# Patient Record
Sex: FEMALE | Race: WHITE | NOT HISPANIC OR LATINO | Employment: UNEMPLOYED | URBAN - METROPOLITAN AREA
[De-identification: names, ages, dates, MRNs, and addresses within clinical notes are randomized per-mention and may not be internally consistent; named-entity substitution may affect disease eponyms.]

---

## 2017-01-27 ENCOUNTER — OFFICE VISIT (OUTPATIENT)
Dept: NEUROLOGY | Facility: CLINIC | Age: 47
End: 2017-01-27

## 2017-01-27 VITALS
HEART RATE: 92 BPM | DIASTOLIC BLOOD PRESSURE: 70 MMHG | SYSTOLIC BLOOD PRESSURE: 112 MMHG | OXYGEN SATURATION: 96 % | HEIGHT: 63 IN

## 2017-01-27 DIAGNOSIS — G62.9 POLYNEUROPATHY: Primary | ICD-10-CM

## 2017-01-27 DIAGNOSIS — R55 SYNCOPE AND COLLAPSE: ICD-10-CM

## 2017-01-27 PROCEDURE — 99205 OFFICE O/P NEW HI 60 MIN: CPT | Performed by: PSYCHIATRY & NEUROLOGY

## 2017-01-27 RX ORDER — CARVEDILOL 6.25 MG/1
6.25 TABLET ORAL 2 TIMES DAILY WITH MEALS
COMMUNITY
End: 2017-08-25

## 2017-01-27 RX ORDER — HYDROCODONE BITARTRATE AND ACETAMINOPHEN 10; 325 MG/1; MG/1
1 TABLET ORAL 3 TIMES DAILY
COMMUNITY

## 2017-01-27 RX ORDER — CITALOPRAM 20 MG/1
20 TABLET ORAL DAILY
COMMUNITY
End: 2017-08-25

## 2017-01-27 RX ORDER — PROMETHAZINE HYDROCHLORIDE 25 MG/1
25 TABLET ORAL EVERY 8 HOURS PRN
COMMUNITY
End: 2017-08-25

## 2017-01-27 RX ORDER — GLYBURIDE-METFORMIN HYDROCHLORIDE 5; 500 MG/1; MG/1
1 TABLET ORAL 3 TIMES DAILY
COMMUNITY
End: 2019-03-19 | Stop reason: HOSPADM

## 2017-01-27 RX ORDER — NAPROXEN 500 MG/1
500 TABLET ORAL 2 TIMES DAILY PRN
COMMUNITY
End: 2019-03-19 | Stop reason: HOSPADM

## 2017-01-27 RX ORDER — GABAPENTIN 600 MG/1
1200 TABLET ORAL 3 TIMES DAILY
COMMUNITY

## 2017-01-27 RX ORDER — LEVETIRACETAM 750 MG/1
TABLET ORAL
COMMUNITY
End: 2017-01-27

## 2017-01-27 RX ORDER — LISINOPRIL 20 MG/1
20 TABLET ORAL DAILY
Qty: 30 TABLET | Refills: 11 | Status: SHIPPED | OUTPATIENT
Start: 2017-01-27 | End: 2017-08-25

## 2017-01-27 RX ORDER — LISINOPRIL 40 MG/1
40 TABLET ORAL DAILY
COMMUNITY
End: 2017-01-27 | Stop reason: SDUPTHER

## 2017-01-27 RX ORDER — TIZANIDINE 4 MG/1
8 TABLET ORAL 3 TIMES DAILY PRN
COMMUNITY

## 2017-01-27 NOTE — LETTER
January 27, 2017     Gardenia Weiss, DREAD  [address]  [address]  [facility] [address]    Patient: Yissel Lopez   YOB: 1970   Date of Visit: 1/27/2017       Dear Dr. Weiss, DREAD:    Thank you for referring Yissel Lopez to me for evaluation. Below are the relevant portions of my assessment and plan of care.         Problems Addressed this Visit        Cardiovascular and Mediastinum    Syncope and collapse     Secondary to autonomic neuropathy from DM.  Decrease Lisinopril 20 mg qday              Nervous and Auditory    Polyneuropathy - Primary     Severe TIIDM neuropathy    D/C Keppra  MRI changes secondary to DM                      If you have questions, please do not hesitate to call me. I look forward to following Yissel along with you.         Sincerely,        Preston Johnston MD        CC: No Recipients

## 2017-01-27 NOTE — LETTER
January 27, 2017     Gabriel Vidales MD  [address] [place] [address]    Patient: Yissel Lopez   YOB: 1970   Date of Visit: 1/27/2017       Dear Dr. Sobeida MD:    Yissel Lopez was in my office today. Below are the relevant portions of my assessment and plan of care.           If you have questions, please do not hesitate to call me. I look forward to following Yissel along with you.         Sincerely,        Preston Johnston MD        CC: No Recipients

## 2017-01-27 NOTE — MR AVS SNAPSHOT
Yissel Lopez   1/27/2017 1:30 PM   Office Visit    Dept Phone:  [phone]   Encounter #:  [id number]    Provider:  Preston Johnston MD   Department:  [facility] NEUROLOGY                Your Full Care Plan              Today's Medication Changes          These changes are accurate as of: 1/27/17  2:26 PM.  If you have any questions, ask your nurse or doctor.               Medication(s)that have changed:     lisinopril 20 MG tablet   Commonly known as:  PRINIVIL,ZESTRIL   Take 1 tablet by mouth Daily.   What changed:    - medication strength  - how much to take   Changed by:  Preston Johnston MD         Stop taking medication(s)listed here:     cephalexin 500 MG capsule   Commonly known as:  KEFLEX   Stopped by:  Preston Johnston MD           levETIRAcetam 750 MG tablet   Commonly known as:  KEPPRA   Stopped by:  Preston Johnston MD                Where to Get Your Medications      These medications were sent to [address] [phone] [place] [id number] [address] [address]     Phone:  [phone]     lisinopril 20 MG tablet                  Your Updated Medication List          This list is accurate as of: 1/27/17  2:26 PM.  Always use your most recent med list.                carvedilol 6.25 MG tablet   Commonly known as:  COREG       citalopram 20 MG tablet   Commonly known as:  CeleXA       gabapentin 600 MG tablet   Commonly known as:  NEURONTIN       glyBURIDE-metFORMIN 5-500 MG per tablet   Commonly known as:  GLUCOVANCE       HYDROcodone-acetaminophen  MG per tablet   Commonly known as:  NORCO       lisinopril 20 MG tablet   Commonly known as:  PRINIVIL,ZESTRIL   Take 1 tablet by mouth Daily.       naproxen 500 MG tablet   Commonly known as:  NAPROSYN       promethazine 25 MG tablet   Commonly known as:  PHENERGAN       tiZANidine 4 MG tablet   Commonly known as:  ZANAFLEX             "     You Were Diagnosed With        Codes Comments    Polyneuropathy    -  Primary ICD-10-CM: G62.9  ICD-9-CM: 356.9     Syncope and collapse     ICD-10-CM: R55  ICD-9-CM: 780.2       Instructions     None    Patient Instructions History      Upcoming Appointments     Visit Type Date Time Department    NEW PATIENT 1/27/2017  1:30 PM [facility]      [employer] Signup     Our records indicate that your [employer] account has been deactivated. If you would like to reactivate your account, please email [email or web address] or call [phone] to talk to our "[email or web address] staff.             Other Info from Your Visit           Allergies     Erythromycin      Tramadol        Reason for Visit     Multiple Sclerosis           Vital Signs     Blood Pressure Pulse Height Oxygen Saturation Smoking Status       112/70 92 63\" (160 cm) 96% Never Smoker       Problems and Diagnoses Noted     Polyneuropathy    Fainting        "

## 2017-01-27 NOTE — PROGRESS NOTES
Subjective:     Patient ID: Yissel Lopez is a 46 y.o. female.    History of Present Illness     45 yo woman referred by Dr Vidales for dizzy spells/syncope and Headaches. Episodes of syncope when going from sitting to standing.  Three episodes of LOC.   HA are located in occiput are daily of mild intensity.  Dull aching pain.   Dizzy spells with change in position.     MRI Brain, my review of films, non specific T2 signal changes    Reviewed Dr Vidales's records:  ALAYNA -normal   SSEP with delayed conduction from c-spine to cortex  Pt reported dizziness, weakness and difficulty walking.   Gluc 458, Na 132,   CBC - microcytic anemia    The following portions of the patient's history were reviewed and updated as appropriate: allergies, current medications, past family history, past medical history, past social history, past surgical history and problem list.    Review of Systems   Constitutional: Positive for fatigue. Negative for activity change and unexpected weight change.   HENT: Negative for tinnitus and trouble swallowing.    Eyes: Negative for photophobia and visual disturbance.   Respiratory: Negative for apnea, cough and choking.    Cardiovascular: Negative for leg swelling.   Gastrointestinal: Negative for nausea and vomiting.   Endocrine: Negative for cold intolerance and heat intolerance.   Genitourinary: Negative for difficulty urinating, frequency, menstrual problem and urgency.   Musculoskeletal: Positive for gait problem. Negative for back pain, myalgias and neck pain.   Skin: Negative for color change and rash.   Allergic/Immunologic: Negative for immunocompromised state.   Neurological: Positive for dizziness, tremors, syncope, weakness and headaches. Negative for seizures, facial asymmetry, speech difficulty, light-headedness and numbness.   Hematological: Negative for adenopathy. Does not bruise/bleed easily.   Psychiatric/Behavioral: Positive for decreased concentration. Negative for behavioral  "problems, confusion, hallucinations and sleep disturbance.        Objective:  Vitals:    01/27/17 1353   BP: 112/70   Pulse: 92   SpO2: 96%   Height: 63\" (160 cm)         Neurologic Exam     Mental Status   Oriented to person, place, and time.   Registration: recalls 3 of 3 objects. Recall at 5 minutes: recalls 3 of 3 objects. Follows 3 step commands.   Attention: decreased. Concentration: decreased.   Speech: slurred   Level of consciousness: alert  Knowledge: good and consistent with education.   Able to name object. Able to read. Able to repeat. Able to write. Normal comprehension.     Cranial Nerves     CN II   Visual fields full to confrontation.   Visual acuity: normal  Right visual field deficit: none  Left visual field deficit: none     CN III, IV, VI   Pupils are equal, round, and reactive to light.  Extraocular motions are normal.   Right pupil: Shape: regular. Reactivity: brisk. Consensual response: intact.   Left pupil: Shape: regular. Reactivity: brisk. Consensual response: intact.   Nystagmus: none   Diplopia: none  Ophthalmoparesis: none  Upgaze: normal  Downgaze: normal  Conjugate gaze: present  Vestibulo-ocular reflex: present    CN V   Facial sensation intact.   Right corneal reflex: normal  Left corneal reflex: normal    CN VII   Right facial weakness: none  Left facial weakness: none    CN VIII   Hearing: intact    CN IX, X   Palate: symmetric  Right gag reflex: normal  Left gag reflex: normal    CN XI   Right sternocleidomastoid strength: normal  Left sternocleidomastoid strength: normal    CN XII   Tongue: not atrophic  Fasciculations: absent  Tongue deviation: none    Motor Exam   Muscle bulk: normal  Overall muscle tone: normal  Right arm tone: normal  Left arm tone: normal  Right leg tone: normal  Left leg tone: normal    Strength   Strength 5/5 throughout.     Sensory Exam   Right arm light touch: decreased from elbow  Left arm light touch: decreased from elbow  Right leg light touch: " decreased from knee  Left leg light touch: decreased from knee  Right arm vibration: decreased from elbow  Left arm vibration: decreased from elbow  Right leg vibration: decreased from knee  Left leg vibration: decreased from knee  Right arm proprioception: decreased from elbow  Left arm proprioception: decreased from elbow  Right leg proprioception: decreased from knee  Left leg proprioception: decreased from knee  Right arm pinprick: decreased from elbow  Left arm pinprick: decreased from elbow  Right leg pinprick: decreased from knee  Left leg pinprick: decreased from knee    Gait, Coordination, and Reflexes     Gait  Gait: shuffling and wide-based    Coordination   Romberg: positive  Finger to nose coordination: abnormal  Heel to shin coordination: abnormal  Tandem walking coordination: abnormal    Tremor   Resting tremor: absent  Intention tremor: present  Action tremor: absent    Reflexes   Reflexes 2+ except as noted.   Right brachioradialis: 0  Left brachioradialis: 0  Right biceps: 0  Left biceps: 0  Right triceps: 0  Left triceps: 0  Right patellar: 0  Left patellar: 0  Right achilles: 0  Left achilles: 0  Right ankle clonus: absent  Left ankle clonus: absent      Physical Exam   Constitutional: She is oriented to person, place, and time.   Eyes: EOM are normal. Pupils are equal, round, and reactive to light.   Neurological: She is oriented to person, place, and time. She has normal strength. She has an abnormal Finger-Nose-Finger Test, an abnormal Heel to More Test, an abnormal Romberg Test and an abnormal Tandem Gait Test.   Reflex Scores:       Tricep reflexes are 0 on the right side and 0 on the left side.       Bicep reflexes are 0 on the right side and 0 on the left side.       Brachioradialis reflexes are 0 on the right side and 0 on the left side.       Patellar reflexes are 0 on the right side and 0 on the left side.       Achilles reflexes are 0 on the right side and 0 on the left  side.  Psychiatric: Her speech is slurred.       Assessment/Plan:       Problems Addressed this Visit        Cardiovascular and Mediastinum    Syncope and collapse     Secondary to autonomic neuropathy from DM.  Decrease Lisinopril 20 mg qday              Nervous and Auditory    Polyneuropathy - Primary     Severe TIIDM neuropathy    D/C Keppra  MRI changes secondary to DM

## 2017-02-27 ENCOUNTER — HOSPITAL ENCOUNTER (EMERGENCY)
Facility: HOSPITAL | Age: 47
Discharge: HOME | End: 2017-02-27
Attending: EMERGENCY MEDICINE | Admitting: EMERGENCY MEDICINE

## 2017-02-27 VITALS
HEIGHT: 63 IN | SYSTOLIC BLOOD PRESSURE: 182 MMHG | DIASTOLIC BLOOD PRESSURE: 89 MMHG | TEMPERATURE: 98.6 F | WEIGHT: 230 LBS | HEART RATE: 90 BPM | OXYGEN SATURATION: 96 % | RESPIRATION RATE: 20 BRPM | BODY MASS INDEX: 40.75 KG/M2

## 2017-02-27 DIAGNOSIS — I10 ESSENTIAL HYPERTENSION: Primary | ICD-10-CM

## 2017-02-27 PROCEDURE — 99282 EMERGENCY DEPT VISIT SF MDM: CPT

## 2017-02-27 RX ORDER — CLONIDINE HYDROCHLORIDE 0.1 MG/1
0.1 TABLET ORAL ONCE
Status: DISCONTINUED | OUTPATIENT
Start: 2017-02-27 | End: 2017-02-27

## 2017-06-07 ENCOUNTER — HOSPITAL ENCOUNTER (EMERGENCY)
Facility: HOSPITAL | Age: 47
Discharge: HOME | End: 2017-06-08
Attending: FAMILY MEDICINE | Admitting: FAMILY MEDICINE

## 2017-06-07 ENCOUNTER — APPOINTMENT (OUTPATIENT)
Dept: GENERAL RADIOLOGY | Facility: HOSPITAL | Age: 47
End: 2017-06-07

## 2017-06-07 ENCOUNTER — APPOINTMENT (OUTPATIENT)
Dept: CT IMAGING | Facility: HOSPITAL | Age: 47
End: 2017-06-07

## 2017-06-07 DIAGNOSIS — R29.6 MULTIPLE FALLS: ICD-10-CM

## 2017-06-07 DIAGNOSIS — E86.0 DEHYDRATION: Primary | ICD-10-CM

## 2017-06-07 DIAGNOSIS — G62.9 POLYNEUROPATHY: ICD-10-CM

## 2017-06-07 LAB
6-ACETYL MORPHINE: NEGATIVE
A-A DO2: 62.2 MMHG (ref 0–300)
ALBUMIN SERPL-MCNC: 3.8 G/DL (ref 3.5–5)
ALBUMIN/GLOB SERPL: 0.9 G/DL (ref 1.5–2.5)
ALP SERPL-CCNC: 121 U/L (ref 35–104)
ALT SERPL W P-5'-P-CCNC: 20 U/L (ref 10–36)
AMPHET+METHAMPHET UR QL: NEGATIVE
AMYLASE SERPL-CCNC: 29 U/L (ref 28–100)
ANION GAP SERPL CALCULATED.3IONS-SCNC: 8.8 MMOL/L (ref 3.6–11.2)
APTT PPP: 30.1 SECONDS (ref 24.4–31)
ARTERIAL PATENCY WRIST A: POSITIVE
AST SERPL-CCNC: 23 U/L (ref 10–30)
ATMOSPHERIC PRESS: 725 MMHG
B-HCG UR QL: NEGATIVE
BACTERIA UR QL AUTO: ABNORMAL /HPF
BARBITURATES UR QL SCN: NEGATIVE
BASE EXCESS BLDA CALC-SCNC: 0.8 MMOL/L
BASOPHILS # BLD AUTO: 0.02 10*3/MM3 (ref 0–0.3)
BASOPHILS NFR BLD AUTO: 0.3 % (ref 0–2)
BDY SITE: ABNORMAL
BENZODIAZ UR QL SCN: NEGATIVE
BILIRUB SERPL-MCNC: 0.3 MG/DL (ref 0.2–1.8)
BILIRUB UR QL STRIP: NEGATIVE
BNP SERPL-MCNC: 76 PG/ML (ref 0–100)
BODY TEMPERATURE: 98.6 C
BUN BLD-MCNC: 14 MG/DL (ref 7–21)
BUN/CREAT SERPL: 13.9 (ref 7–25)
BUPRENORPHINE SERPL-MCNC: NEGATIVE NG/ML
CALCIUM SPEC-SCNC: 9.3 MG/DL (ref 7.7–10)
CANNABINOIDS SERPL QL: NEGATIVE
CHLORIDE SERPL-SCNC: 96 MMOL/L (ref 99–112)
CK MB SERPL-CCNC: <0.18 NG/ML (ref 0–5)
CK MB SERPL-RTO: NORMAL % (ref 0–3)
CK SERPL-CCNC: 176 U/L (ref 24–173)
CLARITY UR: CLEAR
CO2 SERPL-SCNC: 27.2 MMOL/L (ref 24.3–31.9)
COCAINE UR QL: NEGATIVE
COHGB MFR BLD: 1.5 % (ref 0–5)
COLOR UR: YELLOW
CREAT BLD-MCNC: 1.01 MG/DL (ref 0.43–1.29)
D-LACTATE SERPL-SCNC: 1.2 MMOL/L (ref 0.5–2)
DEPRECATED RDW RBC AUTO: 46.6 FL (ref 37–54)
EOSINOPHIL # BLD AUTO: 0.37 10*3/MM3 (ref 0–0.7)
EOSINOPHIL NFR BLD AUTO: 4.9 % (ref 0–5)
ERYTHROCYTE [DISTWIDTH] IN BLOOD BY AUTOMATED COUNT: 17.3 % (ref 11.5–14.5)
GFR SERPL CREATININE-BSD FRML MDRD: 59 ML/MIN/1.73
GLOBULIN UR ELPH-MCNC: 4.4 GM/DL
GLUCOSE BLD-MCNC: 403 MG/DL (ref 70–110)
GLUCOSE UR STRIP-MCNC: ABNORMAL MG/DL
HCO3 BLDA-SCNC: 25.2 MMOL/L (ref 22–26)
HCT VFR BLD AUTO: 31.3 % (ref 37–47)
HCT VFR BLD CALC: 31 % (ref 37–47)
HGB BLD-MCNC: 9.6 G/DL (ref 12–16)
HGB BLDA-MCNC: 10.4 G/DL (ref 12–16)
HGB UR QL STRIP.AUTO: ABNORMAL
HOROWITZ INDEX BLD+IHG-RTO: 28 %
HYALINE CASTS UR QL AUTO: ABNORMAL /LPF
IMM GRANULOCYTES # BLD: 0.02 10*3/MM3 (ref 0–0.03)
IMM GRANULOCYTES NFR BLD: 0.3 % (ref 0–0.5)
INR PPP: 0.95 (ref 0.8–1.1)
KETONES UR QL STRIP: ABNORMAL
LEUKOCYTE ESTERASE UR QL STRIP.AUTO: NEGATIVE
LIPASE SERPL-CCNC: 23 U/L (ref 13–60)
LYMPHOCYTES # BLD AUTO: 1.11 10*3/MM3 (ref 1–3)
LYMPHOCYTES NFR BLD AUTO: 14.8 % (ref 21–51)
MCH RBC QN AUTO: 23.6 PG (ref 27–33)
MCHC RBC AUTO-ENTMCNC: 30.7 G/DL (ref 33–37)
MCV RBC AUTO: 76.9 FL (ref 80–94)
MDMA UR QL SCN: NEGATIVE
METHADONE UR QL SCN: NEGATIVE
METHGB BLD QL: 0.1 % (ref 0–3)
MODALITY: ABNORMAL
MONOCYTES # BLD AUTO: 0.92 10*3/MM3 (ref 0.1–0.9)
MONOCYTES NFR BLD AUTO: 12.3 % (ref 0–10)
NEUTROPHILS # BLD AUTO: 5.05 10*3/MM3 (ref 1.4–6.5)
NEUTROPHILS NFR BLD AUTO: 67.4 % (ref 30–70)
NITRITE UR QL STRIP: NEGATIVE
OPIATES UR QL: POSITIVE
OSMOLALITY SERPL CALC.SUM OF ELEC: 281.9 MOSM/KG (ref 273–305)
OXYCODONE UR QL SCN: NEGATIVE
OXYHGB MFR BLDV: 93.9 % (ref 85–100)
PCO2 BLDA: 39.8 MM HG (ref 35–45)
PCP UR QL SCN: NEGATIVE
PH BLDA: 7.42 PH UNITS (ref 7.35–7.45)
PH UR STRIP.AUTO: 5.5 [PH] (ref 5–8)
PLATELET # BLD AUTO: 388 10*3/MM3 (ref 130–400)
PMV BLD AUTO: 8.7 FL (ref 6–10)
PO2 BLDA: 80.7 MM HG (ref 80–100)
POTASSIUM BLD-SCNC: 4.2 MMOL/L (ref 3.5–5.3)
PROT SERPL-MCNC: 8.2 G/DL (ref 6–8)
PROT UR QL STRIP: ABNORMAL
PROTHROMBIN TIME: 10.5 SECONDS (ref 9.8–11.9)
RBC # BLD AUTO: 4.07 10*6/MM3 (ref 4.2–5.4)
RBC # UR: ABNORMAL /HPF
REF LAB TEST METHOD: ABNORMAL
SAO2 % BLDCOA: 95.4 % (ref 90–100)
SODIUM BLD-SCNC: 132 MMOL/L (ref 135–153)
SP GR UR STRIP: >1.03 (ref 1–1.03)
SQUAMOUS #/AREA URNS HPF: ABNORMAL /HPF
TROPONIN I SERPL-MCNC: 0.03 NG/ML
UROBILINOGEN UR QL STRIP: ABNORMAL
WBC NRBC COR # BLD: 7.49 10*3/MM3 (ref 4.5–12.5)
WBC UR QL AUTO: ABNORMAL /HPF

## 2017-06-07 PROCEDURE — 99284 EMERGENCY DEPT VISIT MOD MDM: CPT

## 2017-06-07 PROCEDURE — 80307 DRUG TEST PRSMV CHEM ANLYZR: CPT | Performed by: FAMILY MEDICINE

## 2017-06-07 PROCEDURE — 85025 COMPLETE CBC W/AUTO DIFF WBC: CPT | Performed by: FAMILY MEDICINE

## 2017-06-07 PROCEDURE — 87186 SC STD MICRODIL/AGAR DIL: CPT | Performed by: FAMILY MEDICINE

## 2017-06-07 PROCEDURE — 83880 ASSAY OF NATRIURETIC PEPTIDE: CPT | Performed by: FAMILY MEDICINE

## 2017-06-07 PROCEDURE — 36415 COLL VENOUS BLD VENIPUNCTURE: CPT

## 2017-06-07 PROCEDURE — 83690 ASSAY OF LIPASE: CPT | Performed by: FAMILY MEDICINE

## 2017-06-07 PROCEDURE — 93010 ELECTROCARDIOGRAM REPORT: CPT | Performed by: INTERNAL MEDICINE

## 2017-06-07 PROCEDURE — 87040 BLOOD CULTURE FOR BACTERIA: CPT | Performed by: FAMILY MEDICINE

## 2017-06-07 PROCEDURE — 82150 ASSAY OF AMYLASE: CPT | Performed by: FAMILY MEDICINE

## 2017-06-07 PROCEDURE — 80053 COMPREHEN METABOLIC PANEL: CPT | Performed by: FAMILY MEDICINE

## 2017-06-07 PROCEDURE — 96360 HYDRATION IV INFUSION INIT: CPT

## 2017-06-07 PROCEDURE — 82553 CREATINE MB FRACTION: CPT | Performed by: FAMILY MEDICINE

## 2017-06-07 PROCEDURE — 81025 URINE PREGNANCY TEST: CPT | Performed by: FAMILY MEDICINE

## 2017-06-07 PROCEDURE — 87086 URINE CULTURE/COLONY COUNT: CPT | Performed by: FAMILY MEDICINE

## 2017-06-07 PROCEDURE — 71010 XR CHEST 1 VW: CPT | Performed by: RADIOLOGY

## 2017-06-07 PROCEDURE — 85730 THROMBOPLASTIN TIME PARTIAL: CPT | Performed by: FAMILY MEDICINE

## 2017-06-07 PROCEDURE — 83050 HGB METHEMOGLOBIN QUAN: CPT | Performed by: FAMILY MEDICINE

## 2017-06-07 PROCEDURE — 82550 ASSAY OF CK (CPK): CPT | Performed by: FAMILY MEDICINE

## 2017-06-07 PROCEDURE — 93005 ELECTROCARDIOGRAM TRACING: CPT | Performed by: FAMILY MEDICINE

## 2017-06-07 PROCEDURE — 96361 HYDRATE IV INFUSION ADD-ON: CPT

## 2017-06-07 PROCEDURE — 87077 CULTURE AEROBIC IDENTIFY: CPT | Performed by: FAMILY MEDICINE

## 2017-06-07 PROCEDURE — 81001 URINALYSIS AUTO W/SCOPE: CPT | Performed by: FAMILY MEDICINE

## 2017-06-07 PROCEDURE — 36600 WITHDRAWAL OF ARTERIAL BLOOD: CPT | Performed by: FAMILY MEDICINE

## 2017-06-07 PROCEDURE — 70450 CT HEAD/BRAIN W/O DYE: CPT

## 2017-06-07 PROCEDURE — 83605 ASSAY OF LACTIC ACID: CPT | Performed by: FAMILY MEDICINE

## 2017-06-07 PROCEDURE — 70450 CT HEAD/BRAIN W/O DYE: CPT | Performed by: RADIOLOGY

## 2017-06-07 PROCEDURE — 82805 BLOOD GASES W/O2 SATURATION: CPT | Performed by: FAMILY MEDICINE

## 2017-06-07 PROCEDURE — 84484 ASSAY OF TROPONIN QUANT: CPT | Performed by: FAMILY MEDICINE

## 2017-06-07 PROCEDURE — 82375 ASSAY CARBOXYHB QUANT: CPT | Performed by: FAMILY MEDICINE

## 2017-06-07 PROCEDURE — 71010 HC CHEST PA OR AP: CPT

## 2017-06-07 PROCEDURE — 85610 PROTHROMBIN TIME: CPT | Performed by: FAMILY MEDICINE

## 2017-06-07 PROCEDURE — P9612 CATHETERIZE FOR URINE SPEC: HCPCS

## 2017-06-07 RX ORDER — SODIUM CHLORIDE 0.9 % (FLUSH) 0.9 %
10 SYRINGE (ML) INJECTION AS NEEDED
Status: DISCONTINUED | OUTPATIENT
Start: 2017-06-07 | End: 2017-06-08 | Stop reason: HOSPADM

## 2017-06-07 RX ADMIN — SODIUM CHLORIDE 1000 ML: 9 INJECTION, SOLUTION INTRAVENOUS at 20:51

## 2017-06-07 RX ADMIN — SODIUM CHLORIDE 3210 ML: 900 INJECTION, SOLUTION INTRAVENOUS at 22:35

## 2017-06-08 VITALS
BODY MASS INDEX: 41.82 KG/M2 | RESPIRATION RATE: 14 BRPM | HEIGHT: 63 IN | OXYGEN SATURATION: 100 % | TEMPERATURE: 97.6 F | SYSTOLIC BLOOD PRESSURE: 149 MMHG | HEART RATE: 73 BPM | DIASTOLIC BLOOD PRESSURE: 82 MMHG | WEIGHT: 236 LBS

## 2017-06-08 LAB
CK MB SERPL-CCNC: <0.18 NG/ML (ref 0–5)
TROPONIN I SERPL-MCNC: 0.02 NG/ML

## 2017-06-08 NOTE — ED NOTES
Straight catheterization performed at this time, patient cleansed with theraworx prior to cath. Patient tolerated well, denies any pain, sterile technique maintained throughout.     Seven Green RN  06/07/17 [id number]

## 2017-06-08 NOTE — ED PROVIDER NOTES
Subjective   Patient is a 47 y.o. female presenting with general illness.   History provided by:  Patient, spouse, EMS personnel and relative  Illness   Location:  Multiple falls, confusion and becoming lethargic over past 2 days- family reports no appetite  Severity:  Mild  Onset quality:  Gradual  Timing:  Intermittent  Progression:  Worsening  Chronicity:  Recurrent  Context:  Type II diabetic   Relieved by:  Nothing  Worsened by:  None  Ineffective treatments:  Nothing  Associated symptoms: fatigue, headaches and shortness of breath    Associated symptoms: no abdominal pain, no chest pain, no congestion, no cough, no diarrhea, no ear pain, no myalgias, no nausea, no rash, no vomiting and no wheezing        Review of Systems   Constitutional: Positive for fatigue. Negative for activity change, appetite change and chills.   HENT: Negative for congestion and ear pain.    Eyes: Negative for pain.   Respiratory: Positive for shortness of breath. Negative for cough, wheezing and stridor.    Cardiovascular: Negative for chest pain.   Gastrointestinal: Negative for abdominal pain, diarrhea, nausea and vomiting.   Endocrine: Negative for polydipsia and polyphagia.   Genitourinary: Negative for dysuria.   Musculoskeletal: Negative for arthralgias, myalgias, neck pain and neck stiffness.   Skin: Negative for rash.   Neurological: Positive for headaches. Negative for dizziness, syncope, speech difficulty and weakness.   Psychiatric/Behavioral: Negative for agitation.       Past Medical History:   Diagnosis Date   • Multiple sclerosis        Allergies   Allergen Reactions   • Erythromycin    • Tramadol        History reviewed. No pertinent surgical history.    Family History   Problem Relation Age of Onset   • Stroke Mother    • Cancer Father        Social History     Social History   • Marital status:      Spouse name: N/A   • Number of children: N/A   • Years of education: N/A     Social History Main Topics   •  Smoking status: Never Smoker   • Smokeless tobacco: None   • Alcohol use No   • Drug use: None   • Sexual activity: Not Asked     Other Topics Concern   • None     Social History Narrative           Objective   Physical Exam   Constitutional: She is oriented to person, place, and time. She appears well-developed and well-nourished.   HENT:   Head: Normocephalic.   Right Ear: External ear normal.   Left Ear: External ear normal.   Nose: Nose normal.   Mouth/Throat: Oropharynx is clear and moist.   Eyes: EOM are normal. Pupils are equal, round, and reactive to light.   Cardiovascular: Exam reveals no friction rub.    No murmur heard.  Pulmonary/Chest: Effort normal and breath sounds normal.   Neurological: She is alert and oriented to person, place, and time. She displays normal reflexes. No cranial nerve deficit. Coordination normal.   Nursing note and vitals reviewed.      Procedures         ED Course  ED Course                  MDM  Number of Diagnoses or Management Options  Dehydration: new and does not require workup  Multiple falls: new and does not require workup  Polyneuropathy: new and does not require workup     Amount and/or Complexity of Data Reviewed  Clinical lab tests: ordered and reviewed  Tests in the radiology section of CPT®: ordered and reviewed  Tests in the medicine section of CPT®: reviewed and ordered  Decide to obtain previous medical records or to obtain history from someone other than the patient: yes  Independent visualization of images, tracings, or specimens: yes    Risk of Complications, Morbidity, and/or Mortality  Presenting problems: moderate  Diagnostic procedures: moderate  Management options: moderate    Patient Progress  Patient progress: improved      Final diagnoses:   Dehydration   Multiple falls   Polyneuropathy            Augusta Valladares DO  06/10/17 0658

## 2017-06-08 NOTE — ED NOTES
Patient is resting on stretcher, patient's  at bedside. Patient's speech remains slurred, patient reports that her speech is normally this way. Patient is alert and oriented x4, updated on plan of care at this time, patient's respirations are regular and unlabored, NADN, will continue to monitor.     Seven Green RN  06/07/17 [id number]

## 2017-06-08 NOTE — ED NOTES
Patient is resting on stretcher, patient provided a lunch box previously per patient's request. Patient has no further needs at this time, patient remains alert and oriented x4. Patient's vital signs within normal limits, patient's respirations are regular and unlabored, NADN, will continue to monitor.     Seven Green RN  06/08/17 0001

## 2017-06-10 LAB — BACTERIA SPEC AEROBE CULT: ABNORMAL

## 2017-06-12 ENCOUNTER — TELEPHONE (OUTPATIENT)
Dept: EMERGENCY DEPT | Facility: HOSPITAL | Age: 47
End: 2017-06-12

## 2017-06-12 LAB — BACTERIA SPEC AEROBE CULT: NORMAL

## 2017-06-13 ENCOUNTER — TELEPHONE (OUTPATIENT)
Dept: EMERGENCY DEPT | Facility: HOSPITAL | Age: 47
End: 2017-06-13

## 2017-06-14 LAB
BACTERIA SPEC AEROBE CULT: ABNORMAL
GRAM STN SPEC: ABNORMAL
ISOLATED FROM: ABNORMAL

## 2017-08-21 ENCOUNTER — HOSPITAL ENCOUNTER (EMERGENCY)
Facility: HOSPITAL | Age: 47
Discharge: HOME | End: 2017-08-22
Attending: EMERGENCY MEDICINE | Admitting: EMERGENCY MEDICINE

## 2017-08-21 DIAGNOSIS — J18.9 PNEUMONIA OF RIGHT LOWER LOBE DUE TO INFECTIOUS ORGANISM: Primary | ICD-10-CM

## 2017-08-21 LAB
A-A DO2: 54.3 MMHG (ref 0–300)
ARTERIAL PATENCY WRIST A: POSITIVE
ATMOSPHERIC PRESS: 731 MMHG
BASE EXCESS BLDA CALC-SCNC: 0.3 MMOL/L
BASOPHILS # BLD AUTO: 0.01 10*3/MM3 (ref 0–0.3)
BASOPHILS NFR BLD AUTO: 0.1 % (ref 0–2)
BDY SITE: ABNORMAL
BODY TEMPERATURE: 98.6 C
COHGB MFR BLD: 1.8 % (ref 0–5)
DEPRECATED RDW RBC AUTO: 50.8 FL (ref 37–54)
EOSINOPHIL # BLD AUTO: 0.13 10*3/MM3 (ref 0–0.7)
EOSINOPHIL NFR BLD AUTO: 1.5 % (ref 0–5)
ERYTHROCYTE [DISTWIDTH] IN BLOOD BY AUTOMATED COUNT: 18.2 % (ref 11.5–14.5)
GLUCOSE BLDC GLUCOMTR-MCNC: 395 MG/DL (ref 70–130)
HCO3 BLDA-SCNC: 24.8 MMOL/L (ref 22–26)
HCT VFR BLD AUTO: 33.2 % (ref 37–47)
HCT VFR BLD CALC: 30 % (ref 37–47)
HGB BLD-MCNC: 10.1 G/DL (ref 12–16)
HGB BLDA-MCNC: 10.2 G/DL (ref 12–16)
HOROWITZ INDEX BLD+IHG-RTO: 21 %
IMM GRANULOCYTES # BLD: 0.05 10*3/MM3 (ref 0–0.03)
IMM GRANULOCYTES NFR BLD: 0.6 % (ref 0–0.5)
LYMPHOCYTES # BLD AUTO: 0.89 10*3/MM3 (ref 1–3)
LYMPHOCYTES NFR BLD AUTO: 10.2 % (ref 21–51)
MCH RBC QN AUTO: 23.1 PG (ref 27–33)
MCHC RBC AUTO-ENTMCNC: 30.4 G/DL (ref 33–37)
MCV RBC AUTO: 76 FL (ref 80–94)
METHGB BLD QL: 0.2 % (ref 0–3)
MODALITY: ABNORMAL
MONOCYTES # BLD AUTO: 0.91 10*3/MM3 (ref 0.1–0.9)
MONOCYTES NFR BLD AUTO: 10.4 % (ref 0–10)
NEUTROPHILS # BLD AUTO: 6.77 10*3/MM3 (ref 1.4–6.5)
NEUTROPHILS NFR BLD AUTO: 77.2 % (ref 30–70)
NRBC BLD MANUAL-RTO: 0 /100 WBC (ref 0–0)
OXYHGB MFR BLDV: 74.3 % (ref 85–100)
PCO2 BLDA: 39.4 MM HG (ref 35–45)
PH BLDA: 7.42 PH UNITS (ref 7.35–7.45)
PLATELET # BLD AUTO: 387 10*3/MM3 (ref 130–400)
PMV BLD AUTO: 8.9 FL (ref 6–10)
PO2 BLDA: 42.2 MM HG (ref 80–100)
RBC # BLD AUTO: 4.37 10*6/MM3 (ref 4.2–5.4)
SAO2 % BLDCOA: 75.8 % (ref 90–100)
WBC NRBC COR # BLD: 8.76 10*3/MM3 (ref 4.5–12.5)

## 2017-08-21 RX ORDER — ACETAMINOPHEN 650 MG/1
1300 SUPPOSITORY RECTAL ONCE
Status: COMPLETED | OUTPATIENT
Start: 2017-08-21 | End: 2017-08-21

## 2017-08-21 RX ADMIN — ACETAMINOPHEN 1300 MG: 650 SUPPOSITORY RECTAL at 23:50

## 2017-08-21 RX ADMIN — DOXYCYCLINE 100 MG: 100 INJECTION, POWDER, LYOPHILIZED, FOR SOLUTION INTRAVENOUS at 23:50

## 2017-08-21 RX ADMIN — CEFTRIAXONE 1 G: 1 INJECTION, POWDER, FOR SOLUTION INTRAMUSCULAR; INTRAVENOUS at 23:50

## 2017-08-21 RX ADMIN — SODIUM CHLORIDE 2000 ML: 9 INJECTION, SOLUTION INTRAVENOUS at 23:33

## 2017-08-21 RX ADMIN — HUMAN INSULIN 8 UNITS: 100 INJECTION, SOLUTION SUBCUTANEOUS at 23:36

## 2017-08-22 ENCOUNTER — APPOINTMENT (OUTPATIENT)
Dept: GENERAL RADIOLOGY | Facility: HOSPITAL | Age: 47
End: 2017-08-22

## 2017-08-22 VITALS
DIASTOLIC BLOOD PRESSURE: 67 MMHG | TEMPERATURE: 98.4 F | HEIGHT: 63 IN | SYSTOLIC BLOOD PRESSURE: 109 MMHG | OXYGEN SATURATION: 94 % | WEIGHT: 224 LBS | HEART RATE: 86 BPM | RESPIRATION RATE: 22 BRPM | BODY MASS INDEX: 39.69 KG/M2

## 2017-08-22 LAB
6-ACETYL MORPHINE: NEGATIVE
ACETONE BLD QL: ABNORMAL
ALBUMIN SERPL-MCNC: 3.8 G/DL (ref 3.5–5)
ALBUMIN/GLOB SERPL: 0.9 G/DL (ref 1.5–2.5)
ALP SERPL-CCNC: 161 U/L (ref 35–104)
ALT SERPL W P-5'-P-CCNC: 16 U/L (ref 10–36)
AMPHET+METHAMPHET UR QL: NEGATIVE
AMYLASE SERPL-CCNC: 37 U/L (ref 28–100)
ANION GAP SERPL CALCULATED.3IONS-SCNC: 11.1 MMOL/L (ref 3.6–11.2)
AST SERPL-CCNC: 25 U/L (ref 10–30)
BACTERIA UR QL AUTO: ABNORMAL /HPF
BARBITURATES UR QL SCN: NEGATIVE
BENZODIAZ UR QL SCN: NEGATIVE
BILIRUB SERPL-MCNC: 0.4 MG/DL (ref 0.2–1.8)
BILIRUB UR QL STRIP: NEGATIVE
BNP SERPL-MCNC: 164 PG/ML (ref 0–100)
BUN BLD-MCNC: 22 MG/DL (ref 7–21)
BUN/CREAT SERPL: 12.5 (ref 7–25)
BUPRENORPHINE SERPL-MCNC: NEGATIVE NG/ML
CALCIUM SPEC-SCNC: 9.7 MG/DL (ref 7.7–10)
CANNABINOIDS SERPL QL: NEGATIVE
CHLORIDE SERPL-SCNC: 98 MMOL/L (ref 99–112)
CK MB SERPL-CCNC: <0.18 NG/ML (ref 0–5)
CLARITY UR: ABNORMAL
CO2 SERPL-SCNC: 24.9 MMOL/L (ref 24.3–31.9)
COCAINE UR QL: NEGATIVE
COLOR UR: YELLOW
CREAT BLD-MCNC: 1.76 MG/DL (ref 0.43–1.29)
D-LACTATE SERPL-SCNC: 1.6 MMOL/L (ref 0.5–2)
ETHANOL BLD-MCNC: <10 MG/DL
ETHANOL UR QL: <0.01 %
GFR SERPL CREATININE-BSD FRML MDRD: 31 ML/MIN/1.73
GLOBULIN UR ELPH-MCNC: 4.3 GM/DL
GLUCOSE BLD-MCNC: 405 MG/DL (ref 70–110)
GLUCOSE UR STRIP-MCNC: ABNORMAL MG/DL
HGB UR QL STRIP.AUTO: ABNORMAL
HYALINE CASTS UR QL AUTO: ABNORMAL /LPF
KETONES UR QL STRIP: ABNORMAL
LEUKOCYTE ESTERASE UR QL STRIP.AUTO: ABNORMAL
LIPASE SERPL-CCNC: 25 U/L (ref 13–60)
METHADONE UR QL SCN: NEGATIVE
NITRITE UR QL STRIP: NEGATIVE
OPIATES UR QL: POSITIVE
OSMOLALITY SERPL CALC.SUM OF ELEC: 288.6 MOSM/KG (ref 273–305)
OXYCODONE UR QL SCN: NEGATIVE
PCP UR QL SCN: NEGATIVE
PH UR STRIP.AUTO: <=5 [PH] (ref 5–8)
POTASSIUM BLD-SCNC: 4.1 MMOL/L (ref 3.5–5.3)
PROT SERPL-MCNC: 8.1 G/DL (ref 6–8)
PROT UR QL STRIP: ABNORMAL
RBC # UR: ABNORMAL /HPF
REF LAB TEST METHOD: ABNORMAL
SODIUM BLD-SCNC: 134 MMOL/L (ref 135–153)
SP GR UR STRIP: 1.03 (ref 1–1.03)
SQUAMOUS #/AREA URNS HPF: ABNORMAL /HPF
TROPONIN I SERPL-MCNC: 0.03 NG/ML
UROBILINOGEN UR QL STRIP: ABNORMAL
WBC UR QL AUTO: ABNORMAL /HPF

## 2017-08-22 PROCEDURE — 71010 XR CHEST 1 VW: CPT | Performed by: RADIOLOGY

## 2017-08-22 RX ORDER — DOXYCYCLINE 100 MG/1
100 CAPSULE ORAL 2 TIMES DAILY
Qty: 14 CAPSULE | Refills: 0 | Status: SHIPPED | OUTPATIENT
Start: 2017-08-22 | End: 2017-08-30 | Stop reason: HOSPADM

## 2017-08-22 RX ADMIN — PRAMOXINE HYDROCHLORIDE HYDROCORTISONE ACETATE 1 APPLICATOR: 100; 100 AEROSOL, FOAM TOPICAL at 01:04

## 2017-08-24 ENCOUNTER — TELEPHONE (OUTPATIENT)
Dept: EMERGENCY DEPT | Facility: HOSPITAL | Age: 47
End: 2017-08-24

## 2017-08-24 LAB
BACTERIA SPEC AEROBE CULT: ABNORMAL
GRAM STN SPEC: ABNORMAL
GRAM STN SPEC: ABNORMAL
ISOLATED FROM: ABNORMAL
ISOLATED FROM: ABNORMAL

## 2017-08-25 ENCOUNTER — APPOINTMENT (OUTPATIENT)
Dept: GENERAL RADIOLOGY | Facility: HOSPITAL | Age: 47
End: 2017-08-25

## 2017-08-25 ENCOUNTER — HOSPITAL ENCOUNTER (INPATIENT)
Facility: HOSPITAL | Age: 47
LOS: 5 days | Discharge: HOME | End: 2017-08-30
Attending: EMERGENCY MEDICINE | Admitting: INTERNAL MEDICINE

## 2017-08-25 DIAGNOSIS — E11.59 TYPE 2 DIABETES MELLITUS WITH OTHER CIRCULATORY COMPLICATION: ICD-10-CM

## 2017-08-25 DIAGNOSIS — N39.0 URINARY TRACT INFECTION WITHOUT HEMATURIA, SITE UNSPECIFIED: ICD-10-CM

## 2017-08-25 DIAGNOSIS — R78.81 BACTEREMIA: Primary | ICD-10-CM

## 2017-08-25 LAB
ALBUMIN SERPL-MCNC: 3.9 G/DL (ref 3.5–5)
ALBUMIN/GLOB SERPL: 0.9 G/DL (ref 1.5–2.5)
ALP SERPL-CCNC: 152 U/L (ref 35–104)
ALT SERPL W P-5'-P-CCNC: 30 U/L (ref 10–36)
ANION GAP SERPL CALCULATED.3IONS-SCNC: 11 MMOL/L (ref 3.6–11.2)
ANISOCYTOSIS BLD QL: ABNORMAL
AST SERPL-CCNC: 43 U/L (ref 10–30)
BACTERIA UR QL AUTO: ABNORMAL /HPF
BILIRUB SERPL-MCNC: 0.4 MG/DL (ref 0.2–1.8)
BILIRUB UR QL STRIP: NEGATIVE
BUN BLD-MCNC: 12 MG/DL (ref 7–21)
BUN/CREAT SERPL: 11.7 (ref 7–25)
CALCIUM SPEC-SCNC: 9.4 MG/DL (ref 7.7–10)
CHLORIDE SERPL-SCNC: 102 MMOL/L (ref 99–112)
CLARITY UR: ABNORMAL
CO2 SERPL-SCNC: 28 MMOL/L (ref 24.3–31.9)
COLOR UR: YELLOW
CREAT BLD-MCNC: 1.03 MG/DL (ref 0.43–1.29)
CRP SERPL-MCNC: 13.73 MG/DL (ref 0–0.99)
D-LACTATE SERPL-SCNC: 1.1 MMOL/L (ref 0.5–2)
DEPRECATED RDW RBC AUTO: 50.3 FL (ref 37–54)
EOSINOPHIL # BLD MANUAL: 0.46 10*3/MM3 (ref 0–0.7)
EOSINOPHIL NFR BLD MANUAL: 4 % (ref 0–5)
ERYTHROCYTE [DISTWIDTH] IN BLOOD BY AUTOMATED COUNT: 19.2 % (ref 11.5–14.5)
GFR SERPL CREATININE-BSD FRML MDRD: 58 ML/MIN/1.73
GLOBULIN UR ELPH-MCNC: 4.4 GM/DL
GLUCOSE BLD-MCNC: 383 MG/DL (ref 70–110)
GLUCOSE BLDC GLUCOMTR-MCNC: 103 MG/DL (ref 70–130)
GLUCOSE BLDC GLUCOMTR-MCNC: 203 MG/DL (ref 70–130)
GLUCOSE UR STRIP-MCNC: ABNORMAL MG/DL
HAV IGM SERPL QL IA: NORMAL
HBA1C MFR BLD: 10.6 % (ref 4.5–5.7)
HBV CORE IGM SERPL QL IA: NORMAL
HBV SURFACE AG SERPL QL IA: NORMAL
HCT VFR BLD AUTO: 35 % (ref 37–47)
HCV AB SER DONR QL: NORMAL
HGB BLD-MCNC: 10.7 G/DL (ref 12–16)
HGB UR QL STRIP.AUTO: ABNORMAL
HYALINE CASTS UR QL AUTO: ABNORMAL /LPF
KETONES UR QL STRIP: ABNORMAL
LARGE PLATELETS: ABNORMAL
LEUKOCYTE ESTERASE UR QL STRIP.AUTO: ABNORMAL
LYMPHOCYTES # BLD MANUAL: 2.4 10*3/MM3 (ref 1–3)
LYMPHOCYTES NFR BLD MANUAL: 21 % (ref 21–51)
LYMPHOCYTES NFR BLD MANUAL: 4 % (ref 0–10)
MCH RBC QN AUTO: 22.7 PG (ref 27–33)
MCHC RBC AUTO-ENTMCNC: 30.6 G/DL (ref 33–37)
MCV RBC AUTO: 74.3 FL (ref 80–94)
MICROCYTES BLD QL: ABNORMAL
MONOCYTES # BLD AUTO: 0.46 10*3/MM3 (ref 0.1–0.9)
MYELOCYTES NFR BLD MANUAL: 1 % (ref 0–0)
NEUTROPHILS # BLD AUTO: 7.88 10*3/MM3 (ref 1.4–6.5)
NEUTROPHILS NFR BLD MANUAL: 69 % (ref 30–70)
NITRITE UR QL STRIP: NEGATIVE
OSMOLALITY SERPL CALC.SUM OF ELEC: 296.8 MOSM/KG (ref 273–305)
PH UR STRIP.AUTO: 5.5 [PH] (ref 5–8)
PLATELET # BLD AUTO: 578 10*3/MM3 (ref 130–400)
PMV BLD AUTO: 8.2 FL (ref 6–10)
POTASSIUM BLD-SCNC: 3 MMOL/L (ref 3.5–5.3)
PROMYELOCYTES NFR BLD MANUAL: 1 % (ref 0–0)
PROT SERPL-MCNC: 8.3 G/DL (ref 6–8)
PROT UR QL STRIP: ABNORMAL
RBC # BLD AUTO: 4.71 10*6/MM3 (ref 4.2–5.4)
RBC # UR: ABNORMAL /HPF
REF LAB TEST METHOD: ABNORMAL
SCAN SLIDE: NORMAL
SMALL PLATELETS BLD QL SMEAR: ABNORMAL
SODIUM BLD-SCNC: 141 MMOL/L (ref 135–153)
SP GR UR STRIP: 1.03 (ref 1–1.03)
SQUAMOUS #/AREA URNS HPF: ABNORMAL /HPF
UROBILINOGEN UR QL STRIP: ABNORMAL
WBC NRBC COR # BLD: 11.42 10*3/MM3 (ref 4.5–12.5)
WBC UR QL AUTO: ABNORMAL /HPF
YEAST URNS QL MICRO: ABNORMAL /HPF

## 2017-08-25 PROCEDURE — 83036 HEMOGLOBIN GLYCOSYLATED A1C: CPT | Performed by: PHYSICIAN ASSISTANT

## 2017-08-25 PROCEDURE — 25010000002 HYDRALAZINE PER 20 MG: Performed by: PHYSICIAN ASSISTANT

## 2017-08-25 PROCEDURE — 86140 C-REACTIVE PROTEIN: CPT | Performed by: PHYSICIAN ASSISTANT

## 2017-08-25 PROCEDURE — 81001 URINALYSIS AUTO W/SCOPE: CPT | Performed by: PHYSICIAN ASSISTANT

## 2017-08-25 PROCEDURE — 80053 COMPREHEN METABOLIC PANEL: CPT | Performed by: PHYSICIAN ASSISTANT

## 2017-08-25 PROCEDURE — 71010 HC CHEST PA OR AP: CPT

## 2017-08-25 PROCEDURE — 82962 GLUCOSE BLOOD TEST: CPT

## 2017-08-25 PROCEDURE — 85025 COMPLETE CBC W/AUTO DIFF WBC: CPT | Performed by: PHYSICIAN ASSISTANT

## 2017-08-25 PROCEDURE — 80074 ACUTE HEPATITIS PANEL: CPT | Performed by: PHYSICIAN ASSISTANT

## 2017-08-25 PROCEDURE — 93005 ELECTROCARDIOGRAM TRACING: CPT | Performed by: PHYSICIAN ASSISTANT

## 2017-08-25 PROCEDURE — 63710000001 INSULIN DETEMIR PER 5 UNITS: Performed by: INTERNAL MEDICINE

## 2017-08-25 PROCEDURE — 99223 1ST HOSP IP/OBS HIGH 75: CPT | Performed by: INTERNAL MEDICINE

## 2017-08-25 PROCEDURE — 87147 CULTURE TYPE IMMUNOLOGIC: CPT | Performed by: PHYSICIAN ASSISTANT

## 2017-08-25 PROCEDURE — 87077 CULTURE AEROBIC IDENTIFY: CPT | Performed by: PHYSICIAN ASSISTANT

## 2017-08-25 PROCEDURE — 63710000001 INSULIN ASPART PER 5 UNITS: Performed by: PHYSICIAN ASSISTANT

## 2017-08-25 PROCEDURE — 87040 BLOOD CULTURE FOR BACTERIA: CPT | Performed by: PHYSICIAN ASSISTANT

## 2017-08-25 PROCEDURE — 71010 XR CHEST 1 VW: CPT | Performed by: RADIOLOGY

## 2017-08-25 PROCEDURE — 25010000002 CYANOCOBALAMIN PER 1000 MCG: Performed by: INTERNAL MEDICINE

## 2017-08-25 PROCEDURE — 94799 UNLISTED PULMONARY SVC/PX: CPT

## 2017-08-25 PROCEDURE — 93010 ELECTROCARDIOGRAM REPORT: CPT | Performed by: INTERNAL MEDICINE

## 2017-08-25 PROCEDURE — 25010000002 HEPARIN (PORCINE) PER 1000 UNITS

## 2017-08-25 PROCEDURE — 83605 ASSAY OF LACTIC ACID: CPT | Performed by: PHYSICIAN ASSISTANT

## 2017-08-25 PROCEDURE — 25010000002 CEFTRIAXONE: Performed by: PHYSICIAN ASSISTANT

## 2017-08-25 PROCEDURE — 63710000001 INSULIN ASPART PER 5 UNITS: Performed by: INTERNAL MEDICINE

## 2017-08-25 PROCEDURE — 85007 BL SMEAR W/DIFF WBC COUNT: CPT | Performed by: PHYSICIAN ASSISTANT

## 2017-08-25 PROCEDURE — 99284 EMERGENCY DEPT VISIT MOD MDM: CPT

## 2017-08-25 PROCEDURE — 87086 URINE CULTURE/COLONY COUNT: CPT | Performed by: PHYSICIAN ASSISTANT

## 2017-08-25 PROCEDURE — 87186 SC STD MICRODIL/AGAR DIL: CPT | Performed by: PHYSICIAN ASSISTANT

## 2017-08-25 RX ORDER — GLYBURIDE-METFORMIN HYDROCHLORIDE 5; 500 MG/1; MG/1
1 TABLET ORAL 3 TIMES DAILY
Status: CANCELLED | OUTPATIENT
Start: 2017-08-25

## 2017-08-25 RX ORDER — NAPROXEN 250 MG/1
500 TABLET ORAL 2 TIMES DAILY PRN
Status: DISCONTINUED | OUTPATIENT
Start: 2017-08-25 | End: 2017-08-30 | Stop reason: HOSPADM

## 2017-08-25 RX ORDER — HYDROCODONE BITARTRATE AND ACETAMINOPHEN 10; 325 MG/1; MG/1
1 TABLET ORAL EVERY 8 HOURS PRN
Status: DISCONTINUED | OUTPATIENT
Start: 2017-08-25 | End: 2017-08-30 | Stop reason: HOSPADM

## 2017-08-25 RX ORDER — HYDRALAZINE HYDROCHLORIDE 20 MG/ML
10 INJECTION INTRAMUSCULAR; INTRAVENOUS EVERY 6 HOURS PRN
Status: DISCONTINUED | OUTPATIENT
Start: 2017-08-25 | End: 2017-08-30 | Stop reason: HOSPADM

## 2017-08-25 RX ORDER — SODIUM CHLORIDE 0.9 % (FLUSH) 0.9 %
10 SYRINGE (ML) INJECTION AS NEEDED
Status: DISCONTINUED | OUTPATIENT
Start: 2017-08-25 | End: 2017-08-30 | Stop reason: HOSPADM

## 2017-08-25 RX ORDER — LEVETIRACETAM 750 MG/1
1500 TABLET ORAL NIGHTLY
COMMUNITY
End: 2017-08-25

## 2017-08-25 RX ORDER — GABAPENTIN 400 MG/1
800 CAPSULE ORAL 3 TIMES DAILY
Status: DISCONTINUED | OUTPATIENT
Start: 2017-08-25 | End: 2017-08-30 | Stop reason: HOSPADM

## 2017-08-25 RX ORDER — NITROGLYCERIN 0.4 MG/1
0.4 TABLET SUBLINGUAL
Status: DISCONTINUED | OUTPATIENT
Start: 2017-08-25 | End: 2017-08-30 | Stop reason: HOSPADM

## 2017-08-25 RX ORDER — CARVEDILOL 6.25 MG/1
6.25 TABLET ORAL EVERY 12 HOURS SCHEDULED
Status: DISCONTINUED | OUTPATIENT
Start: 2017-08-25 | End: 2017-08-26

## 2017-08-25 RX ORDER — AMLODIPINE BESYLATE 5 MG/1
2.5 TABLET ORAL DAILY
Status: CANCELLED | OUTPATIENT
Start: 2017-08-25

## 2017-08-25 RX ORDER — POTASSIUM CHLORIDE 7.45 MG/ML
10 INJECTION INTRAVENOUS
Status: DISCONTINUED | OUTPATIENT
Start: 2017-08-25 | End: 2017-08-30 | Stop reason: HOSPADM

## 2017-08-25 RX ORDER — CYANOCOBALAMIN 1000 UG/ML
1000 INJECTION, SOLUTION INTRAMUSCULAR; SUBCUTANEOUS WEEKLY
Status: DISCONTINUED | OUTPATIENT
Start: 2017-08-25 | End: 2017-08-30 | Stop reason: HOSPADM

## 2017-08-25 RX ORDER — AMLODIPINE BESYLATE 2.5 MG/1
2.5 TABLET ORAL DAILY
COMMUNITY
End: 2017-08-30 | Stop reason: HOSPADM

## 2017-08-25 RX ORDER — POTASSIUM CHLORIDE 750 MG/1
40 CAPSULE, EXTENDED RELEASE ORAL AS NEEDED
Status: DISCONTINUED | OUTPATIENT
Start: 2017-08-25 | End: 2017-08-30 | Stop reason: HOSPADM

## 2017-08-25 RX ORDER — LEVETIRACETAM 750 MG/1
750 TABLET ORAL EVERY MORNING
COMMUNITY
End: 2017-08-25

## 2017-08-25 RX ORDER — CARVEDILOL 12.5 MG/1
12.5 TABLET ORAL 2 TIMES DAILY WITH MEALS
COMMUNITY
End: 2017-08-25

## 2017-08-25 RX ORDER — NICOTINE POLACRILEX 4 MG
15 LOZENGE BUCCAL
Status: DISCONTINUED | OUTPATIENT
Start: 2017-08-25 | End: 2017-08-30 | Stop reason: HOSPADM

## 2017-08-25 RX ORDER — INSULIN GLARGINE 100 [IU]/ML
15 INJECTION, SOLUTION SUBCUTANEOUS NIGHTLY
COMMUNITY
End: 2017-08-25

## 2017-08-25 RX ORDER — HEPARIN SODIUM 5000 [USP'U]/ML
5000 INJECTION, SOLUTION INTRAVENOUS; SUBCUTANEOUS EVERY 12 HOURS SCHEDULED
Status: DISCONTINUED | OUTPATIENT
Start: 2017-08-25 | End: 2017-08-30 | Stop reason: HOSPADM

## 2017-08-25 RX ORDER — POTASSIUM CHLORIDE 20 MEQ/1
40 TABLET, EXTENDED RELEASE ORAL ONCE
Status: COMPLETED | OUTPATIENT
Start: 2017-08-25 | End: 2017-08-25

## 2017-08-25 RX ORDER — POTASSIUM CHLORIDE 20 MEQ/1
40 TABLET, EXTENDED RELEASE ORAL EVERY 4 HOURS
Status: COMPLETED | OUTPATIENT
Start: 2017-08-25 | End: 2017-08-26

## 2017-08-25 RX ORDER — SODIUM CHLORIDE 0.9 % (FLUSH) 0.9 %
1-10 SYRINGE (ML) INJECTION AS NEEDED
Status: DISCONTINUED | OUTPATIENT
Start: 2017-08-25 | End: 2017-08-30 | Stop reason: HOSPADM

## 2017-08-25 RX ORDER — CARVEDILOL 6.25 MG/1
12.5 TABLET ORAL ONCE
Status: COMPLETED | OUTPATIENT
Start: 2017-08-25 | End: 2017-08-25

## 2017-08-25 RX ORDER — AMLODIPINE BESYLATE 5 MG/1
5 TABLET ORAL
Status: DISCONTINUED | OUTPATIENT
Start: 2017-08-25 | End: 2017-08-30 | Stop reason: HOSPADM

## 2017-08-25 RX ORDER — LORAZEPAM 0.5 MG/1
0.5 TABLET ORAL DAILY PRN
Status: DISCONTINUED | OUTPATIENT
Start: 2017-08-25 | End: 2017-08-30 | Stop reason: HOSPADM

## 2017-08-25 RX ORDER — DOXYCYCLINE 100 MG/1
100 CAPSULE ORAL 2 TIMES DAILY
Status: CANCELLED | OUTPATIENT
Start: 2017-08-25

## 2017-08-25 RX ORDER — DEXTROSE MONOHYDRATE 25 G/50ML
25 INJECTION, SOLUTION INTRAVENOUS
Status: DISCONTINUED | OUTPATIENT
Start: 2017-08-25 | End: 2017-08-30 | Stop reason: HOSPADM

## 2017-08-25 RX ORDER — HEPARIN SODIUM 5000 [USP'U]/ML
INJECTION, SOLUTION INTRAVENOUS; SUBCUTANEOUS
Status: COMPLETED
Start: 2017-08-25 | End: 2017-08-25

## 2017-08-25 RX ORDER — AMLODIPINE BESYLATE 5 MG/1
2.5 TABLET ORAL ONCE
Status: COMPLETED | OUTPATIENT
Start: 2017-08-25 | End: 2017-08-25

## 2017-08-25 RX ORDER — CYANOCOBALAMIN 1000 UG/ML
1000 INJECTION, SOLUTION INTRAMUSCULAR; SUBCUTANEOUS WEEKLY
Status: ON HOLD | COMMUNITY
End: 2019-03-17

## 2017-08-25 RX ORDER — LORAZEPAM 0.5 MG/1
0.5 TABLET ORAL NIGHTLY
COMMUNITY

## 2017-08-25 RX ORDER — TIZANIDINE 4 MG/1
4 TABLET ORAL 3 TIMES DAILY PRN
Status: DISCONTINUED | OUTPATIENT
Start: 2017-08-25 | End: 2017-08-30 | Stop reason: HOSPADM

## 2017-08-25 RX ORDER — POTASSIUM CHLORIDE 1.5 G/1.77G
40 POWDER, FOR SOLUTION ORAL AS NEEDED
Status: DISCONTINUED | OUTPATIENT
Start: 2017-08-25 | End: 2017-08-30 | Stop reason: HOSPADM

## 2017-08-25 RX ADMIN — POTASSIUM CHLORIDE 40 MEQ: 1500 TABLET, EXTENDED RELEASE ORAL at 14:06

## 2017-08-25 RX ADMIN — GABAPENTIN 800 MG: 400 CAPSULE ORAL at 20:06

## 2017-08-25 RX ADMIN — AMLODIPINE BESYLATE 2.5 MG: 5 TABLET ORAL at 14:46

## 2017-08-25 RX ADMIN — Medication 10 ML: at 20:07

## 2017-08-25 RX ADMIN — INSULIN ASPART 10 UNITS: 100 INJECTION, SOLUTION INTRAVENOUS; SUBCUTANEOUS at 14:44

## 2017-08-25 RX ADMIN — CARVEDILOL 12.5 MG: 6.25 TABLET, FILM COATED ORAL at 14:45

## 2017-08-25 RX ADMIN — HYDRALAZINE HYDROCHLORIDE 10 MG: 20 INJECTION INTRAMUSCULAR; INTRAVENOUS at 20:07

## 2017-08-25 RX ADMIN — GABAPENTIN 800 MG: 400 CAPSULE ORAL at 17:48

## 2017-08-25 RX ADMIN — LORAZEPAM 0.5 MG: 0.5 TABLET ORAL at 20:06

## 2017-08-25 RX ADMIN — INSULIN ASPART 5 UNITS: 100 INJECTION, SOLUTION INTRAVENOUS; SUBCUTANEOUS at 17:48

## 2017-08-25 RX ADMIN — INSULIN DETEMIR 20 UNITS: 100 INJECTION, SOLUTION SUBCUTANEOUS at 17:50

## 2017-08-25 RX ADMIN — HEPARIN SODIUM 5000 UNITS: 5000 INJECTION, SOLUTION INTRAVENOUS; SUBCUTANEOUS at 20:07

## 2017-08-25 RX ADMIN — AMLODIPINE BESYLATE 5 MG: 5 TABLET ORAL at 17:48

## 2017-08-25 RX ADMIN — CARVEDILOL 6.25 MG: 6.25 TABLET, FILM COATED ORAL at 20:06

## 2017-08-25 RX ADMIN — INSULIN ASPART 3 UNITS: 100 INJECTION, SOLUTION INTRAVENOUS; SUBCUTANEOUS at 17:49

## 2017-08-25 RX ADMIN — HYDROCODONE BITARTRATE AND ACETAMINOPHEN 1 TABLET: 10; 325 TABLET ORAL at 18:54

## 2017-08-25 RX ADMIN — CEFTRIAXONE 1 G: 1 INJECTION, POWDER, FOR SOLUTION INTRAMUSCULAR; INTRAVENOUS at 13:08

## 2017-08-25 RX ADMIN — POTASSIUM CHLORIDE 40 MEQ: 1500 TABLET, EXTENDED RELEASE ORAL at 20:06

## 2017-08-25 RX ADMIN — POTASSIUM CHLORIDE 40 MEQ: 1500 TABLET, EXTENDED RELEASE ORAL at 17:47

## 2017-08-26 LAB
ANION GAP SERPL CALCULATED.3IONS-SCNC: 7.2 MMOL/L (ref 3.6–11.2)
ANISOCYTOSIS BLD QL: ABNORMAL
BUN BLD-MCNC: 12 MG/DL (ref 7–21)
BUN/CREAT SERPL: 14.6 (ref 7–25)
CALCIUM SPEC-SCNC: 8.6 MG/DL (ref 7.7–10)
CHLORIDE SERPL-SCNC: 108 MMOL/L (ref 99–112)
CO2 SERPL-SCNC: 28.8 MMOL/L (ref 24.3–31.9)
CREAT BLD-MCNC: 0.82 MG/DL (ref 0.43–1.29)
CRP SERPL-MCNC: 8.46 MG/DL (ref 0–0.99)
DEPRECATED RDW RBC AUTO: 50.7 FL (ref 37–54)
EOSINOPHIL # BLD MANUAL: 1.12 10*3/MM3 (ref 0–0.7)
EOSINOPHIL NFR BLD MANUAL: 9 % (ref 0–5)
ERYTHROCYTE [DISTWIDTH] IN BLOOD BY AUTOMATED COUNT: 19.2 % (ref 11.5–14.5)
GFR SERPL CREATININE-BSD FRML MDRD: 75 ML/MIN/1.73
GLUCOSE BLD-MCNC: 100 MG/DL (ref 70–110)
GLUCOSE BLDC GLUCOMTR-MCNC: 118 MG/DL (ref 70–130)
GLUCOSE BLDC GLUCOMTR-MCNC: 159 MG/DL (ref 70–130)
GLUCOSE BLDC GLUCOMTR-MCNC: 161 MG/DL (ref 70–130)
GLUCOSE BLDC GLUCOMTR-MCNC: 275 MG/DL (ref 70–130)
HCT VFR BLD AUTO: 33 % (ref 37–47)
HGB BLD-MCNC: 10.1 G/DL (ref 12–16)
HYPOCHROMIA BLD QL: ABNORMAL
LYMPHOCYTES # BLD MANUAL: 3.1 10*3/MM3 (ref 1–3)
LYMPHOCYTES NFR BLD MANUAL: 25 % (ref 21–51)
LYMPHOCYTES NFR BLD MANUAL: 5 % (ref 0–10)
MAGNESIUM SERPL-MCNC: 1.9 MG/DL (ref 1.7–2.6)
MCH RBC QN AUTO: 23.2 PG (ref 27–33)
MCHC RBC AUTO-ENTMCNC: 30.6 G/DL (ref 33–37)
MCV RBC AUTO: 75.7 FL (ref 80–94)
MICROCYTES BLD QL: ABNORMAL
MONOCYTES # BLD AUTO: 0.62 10*3/MM3 (ref 0.1–0.9)
NEUTROPHILS # BLD AUTO: 7.32 10*3/MM3 (ref 1.4–6.5)
NEUTROPHILS NFR BLD MANUAL: 59 % (ref 30–70)
OSMOLALITY SERPL CALC.SUM OF ELEC: 286.7 MOSM/KG (ref 273–305)
PHOSPHATE SERPL-MCNC: 1.5 MG/DL (ref 2.7–4.5)
PLAT MORPH BLD: NORMAL
PLATELET # BLD AUTO: 488 10*3/MM3 (ref 130–400)
PMV BLD AUTO: 8.4 FL (ref 6–10)
POTASSIUM BLD-SCNC: 3.7 MMOL/L (ref 3.5–5.3)
PROMYELOCYTES NFR BLD MANUAL: 2 % (ref 0–0)
RBC # BLD AUTO: 4.36 10*6/MM3 (ref 4.2–5.4)
SODIUM BLD-SCNC: 144 MMOL/L (ref 135–153)
TSH SERPL DL<=0.05 MIU/L-ACNC: 1.49 MIU/ML (ref 0.55–4.78)
WBC NRBC COR # BLD: 12.4 10*3/MM3 (ref 4.5–12.5)

## 2017-08-26 PROCEDURE — 25010000002 CEFTRIAXONE: Performed by: PHYSICIAN ASSISTANT

## 2017-08-26 PROCEDURE — 84100 ASSAY OF PHOSPHORUS: CPT | Performed by: INTERNAL MEDICINE

## 2017-08-26 PROCEDURE — 63710000001 INSULIN DETEMIR PER 5 UNITS: Performed by: INTERNAL MEDICINE

## 2017-08-26 PROCEDURE — 25010000002 HEPARIN (PORCINE) PER 1000 UNITS: Performed by: PHYSICIAN ASSISTANT

## 2017-08-26 PROCEDURE — G8998 SWALLOW D/C STATUS: HCPCS

## 2017-08-26 PROCEDURE — 99233 SBSQ HOSP IP/OBS HIGH 50: CPT | Performed by: INTERNAL MEDICINE

## 2017-08-26 PROCEDURE — 25010000002 HYDRALAZINE PER 20 MG: Performed by: PHYSICIAN ASSISTANT

## 2017-08-26 PROCEDURE — 63710000001 INSULIN ASPART PER 5 UNITS: Performed by: INTERNAL MEDICINE

## 2017-08-26 PROCEDURE — G8997 SWALLOW GOAL STATUS: HCPCS

## 2017-08-26 PROCEDURE — 94799 UNLISTED PULMONARY SVC/PX: CPT

## 2017-08-26 PROCEDURE — 86140 C-REACTIVE PROTEIN: CPT | Performed by: PHYSICIAN ASSISTANT

## 2017-08-26 PROCEDURE — 63710000001 INSULIN ASPART PER 5 UNITS: Performed by: PHYSICIAN ASSISTANT

## 2017-08-26 PROCEDURE — 85025 COMPLETE CBC W/AUTO DIFF WBC: CPT | Performed by: PHYSICIAN ASSISTANT

## 2017-08-26 PROCEDURE — 83735 ASSAY OF MAGNESIUM: CPT | Performed by: INTERNAL MEDICINE

## 2017-08-26 PROCEDURE — 82962 GLUCOSE BLOOD TEST: CPT

## 2017-08-26 PROCEDURE — G8996 SWALLOW CURRENT STATUS: HCPCS

## 2017-08-26 PROCEDURE — 84443 ASSAY THYROID STIM HORMONE: CPT | Performed by: INTERNAL MEDICINE

## 2017-08-26 PROCEDURE — 85007 BL SMEAR W/DIFF WBC COUNT: CPT | Performed by: PHYSICIAN ASSISTANT

## 2017-08-26 PROCEDURE — 92610 EVALUATE SWALLOWING FUNCTION: CPT

## 2017-08-26 PROCEDURE — 80048 BASIC METABOLIC PNL TOTAL CA: CPT | Performed by: PHYSICIAN ASSISTANT

## 2017-08-26 RX ORDER — CARVEDILOL 6.25 MG/1
12.5 TABLET ORAL EVERY 12 HOURS SCHEDULED
Status: DISCONTINUED | OUTPATIENT
Start: 2017-08-26 | End: 2017-08-27

## 2017-08-26 RX ADMIN — HYDROCODONE BITARTRATE AND ACETAMINOPHEN 1 TABLET: 10; 325 TABLET ORAL at 18:17

## 2017-08-26 RX ADMIN — CEFTRIAXONE 2 G: 2 INJECTION, POWDER, FOR SOLUTION INTRAMUSCULAR; INTRAVENOUS at 12:24

## 2017-08-26 RX ADMIN — HEPARIN SODIUM 5000 UNITS: 5000 INJECTION, SOLUTION INTRAVENOUS; SUBCUTANEOUS at 20:00

## 2017-08-26 RX ADMIN — GABAPENTIN 800 MG: 400 CAPSULE ORAL at 20:00

## 2017-08-26 RX ADMIN — HYDRALAZINE HYDROCHLORIDE 10 MG: 20 INJECTION INTRAMUSCULAR; INTRAVENOUS at 06:41

## 2017-08-26 RX ADMIN — INSULIN ASPART 2 UNITS: 100 INJECTION, SOLUTION INTRAVENOUS; SUBCUTANEOUS at 18:04

## 2017-08-26 RX ADMIN — INSULIN ASPART 8 UNITS: 100 INJECTION, SOLUTION INTRAVENOUS; SUBCUTANEOUS at 18:04

## 2017-08-26 RX ADMIN — POTASSIUM CHLORIDE 40 MEQ: 1500 TABLET, EXTENDED RELEASE ORAL at 00:32

## 2017-08-26 RX ADMIN — GABAPENTIN 800 MG: 400 CAPSULE ORAL at 15:44

## 2017-08-26 RX ADMIN — CARVEDILOL 12.5 MG: 6.25 TABLET, FILM COATED ORAL at 19:59

## 2017-08-26 RX ADMIN — HYDRALAZINE HYDROCHLORIDE 10 MG: 20 INJECTION INTRAMUSCULAR; INTRAVENOUS at 12:26

## 2017-08-26 RX ADMIN — SODIUM PHOSPHATE, MONOBASIC, MONOHYDRATE 15 MMOL: 276; 142 INJECTION, SOLUTION INTRAVENOUS at 15:44

## 2017-08-26 RX ADMIN — HYDROCODONE BITARTRATE AND ACETAMINOPHEN 1 TABLET: 10; 325 TABLET ORAL at 09:38

## 2017-08-26 RX ADMIN — AMLODIPINE BESYLATE 5 MG: 5 TABLET ORAL at 09:00

## 2017-08-26 RX ADMIN — INSULIN DETEMIR 20 UNITS: 100 INJECTION, SOLUTION SUBCUTANEOUS at 09:00

## 2017-08-26 RX ADMIN — GABAPENTIN 800 MG: 400 CAPSULE ORAL at 09:00

## 2017-08-26 RX ADMIN — INSULIN ASPART 8 UNITS: 100 INJECTION, SOLUTION INTRAVENOUS; SUBCUTANEOUS at 12:24

## 2017-08-26 RX ADMIN — INSULIN ASPART 2 UNITS: 100 INJECTION, SOLUTION INTRAVENOUS; SUBCUTANEOUS at 20:02

## 2017-08-26 RX ADMIN — INSULIN ASPART 4 UNITS: 100 INJECTION, SOLUTION INTRAVENOUS; SUBCUTANEOUS at 12:26

## 2017-08-26 RX ADMIN — CARVEDILOL 6.25 MG: 6.25 TABLET, FILM COATED ORAL at 09:00

## 2017-08-26 RX ADMIN — HEPARIN SODIUM 5000 UNITS: 5000 INJECTION, SOLUTION INTRAVENOUS; SUBCUTANEOUS at 09:00

## 2017-08-27 LAB
ALBUMIN SERPL-MCNC: 3.2 G/DL (ref 3.5–5)
ALBUMIN/GLOB SERPL: 0.8 G/DL (ref 1.5–2.5)
ALP SERPL-CCNC: 101 U/L (ref 35–104)
ALT SERPL W P-5'-P-CCNC: 19 U/L (ref 10–36)
ANION GAP SERPL CALCULATED.3IONS-SCNC: 6.2 MMOL/L (ref 3.6–11.2)
ANISOCYTOSIS BLD QL: ABNORMAL
AST SERPL-CCNC: 22 U/L (ref 10–30)
BASOPHILS # BLD MANUAL: 0.11 10*3/MM3 (ref 0–0.3)
BASOPHILS NFR BLD AUTO: 1 % (ref 0–2)
BILIRUB SERPL-MCNC: 0.2 MG/DL (ref 0.2–1.8)
BUN BLD-MCNC: 9 MG/DL (ref 7–21)
BUN/CREAT SERPL: 11.8 (ref 7–25)
CALCIUM SPEC-SCNC: 8.7 MG/DL (ref 7.7–10)
CHLORIDE SERPL-SCNC: 105 MMOL/L (ref 99–112)
CO2 SERPL-SCNC: 29.8 MMOL/L (ref 24.3–31.9)
CREAT BLD-MCNC: 0.76 MG/DL (ref 0.43–1.29)
CRP SERPL-MCNC: 10.08 MG/DL (ref 0–0.99)
DEPRECATED RDW RBC AUTO: 51.1 FL (ref 37–54)
EOSINOPHIL # BLD MANUAL: 0.44 10*3/MM3 (ref 0–0.7)
EOSINOPHIL NFR BLD MANUAL: 4 % (ref 0–5)
ERYTHROCYTE [DISTWIDTH] IN BLOOD BY AUTOMATED COUNT: 19.4 % (ref 11.5–14.5)
FERRITIN SERPL-MCNC: 157 NG/ML (ref 10–290.3)
FOLATE SERPL-MCNC: 11.96 NG/ML (ref 5.4–20)
GFR SERPL CREATININE-BSD FRML MDRD: 82 ML/MIN/1.73
GLOBULIN UR ELPH-MCNC: 3.9 GM/DL
GLUCOSE BLD-MCNC: 132 MG/DL (ref 70–110)
GLUCOSE BLDC GLUCOMTR-MCNC: 129 MG/DL (ref 70–130)
GLUCOSE BLDC GLUCOMTR-MCNC: 168 MG/DL (ref 70–130)
GLUCOSE BLDC GLUCOMTR-MCNC: 224 MG/DL (ref 70–130)
GLUCOSE BLDC GLUCOMTR-MCNC: 237 MG/DL (ref 70–130)
HCT VFR BLD AUTO: 30.8 % (ref 37–47)
HGB BLD-MCNC: 9.5 G/DL (ref 12–16)
HYPOCHROMIA BLD QL: ABNORMAL
IRON 24H UR-MRATE: 16 MCG/DL (ref 49–151)
IRON SATN MFR SERPL: 7 % (ref 15–50)
LYMPHOCYTES # BLD MANUAL: 2.18 10*3/MM3 (ref 1–3)
LYMPHOCYTES NFR BLD MANUAL: 10 % (ref 0–10)
LYMPHOCYTES NFR BLD MANUAL: 20 % (ref 21–51)
MAGNESIUM SERPL-MCNC: 1.7 MG/DL (ref 1.7–2.6)
MCH RBC QN AUTO: 23.5 PG (ref 27–33)
MCHC RBC AUTO-ENTMCNC: 30.8 G/DL (ref 33–37)
MCV RBC AUTO: 76.2 FL (ref 80–94)
MICROCYTES BLD QL: ABNORMAL
MONOCYTES # BLD AUTO: 1.09 10*3/MM3 (ref 0.1–0.9)
NEUTROPHILS # BLD AUTO: 7.09 10*3/MM3 (ref 1.4–6.5)
NEUTROPHILS NFR BLD MANUAL: 63 % (ref 30–70)
NEUTS BAND NFR BLD MANUAL: 2 % (ref 4–12)
OSMOLALITY SERPL CALC.SUM OF ELEC: 281.8 MOSM/KG (ref 273–305)
PHOSPHATE SERPL-MCNC: 3 MG/DL (ref 2.7–4.5)
PLAT MORPH BLD: NORMAL
PLATELET # BLD AUTO: 426 10*3/MM3 (ref 130–400)
PMV BLD AUTO: 8.2 FL (ref 6–10)
POTASSIUM BLD-SCNC: 3.2 MMOL/L (ref 3.5–5.3)
POTASSIUM BLD-SCNC: 4.2 MMOL/L (ref 3.5–5.3)
PROT SERPL-MCNC: 7.1 G/DL (ref 6–8)
RBC # BLD AUTO: 4.04 10*6/MM3 (ref 4.2–5.4)
RETICS #: 0.06 10*6/MM3 (ref 0.02–0.13)
RETICS/RBC NFR AUTO: 1.58 % (ref 0.5–2)
SCAN SLIDE: NORMAL
SODIUM BLD-SCNC: 141 MMOL/L (ref 135–153)
TIBC SERPL-MCNC: 235 MCG/DL (ref 241–421)
VIT B12 BLD-MCNC: >2000 PG/ML (ref 211–911)
WBC NRBC COR # BLD: 10.9 10*3/MM3 (ref 4.5–12.5)

## 2017-08-27 PROCEDURE — 86140 C-REACTIVE PROTEIN: CPT | Performed by: INTERNAL MEDICINE

## 2017-08-27 PROCEDURE — 63710000001 INSULIN ASPART PER 5 UNITS: Performed by: PHYSICIAN ASSISTANT

## 2017-08-27 PROCEDURE — 85045 AUTOMATED RETICULOCYTE COUNT: CPT | Performed by: INTERNAL MEDICINE

## 2017-08-27 PROCEDURE — 82728 ASSAY OF FERRITIN: CPT | Performed by: INTERNAL MEDICINE

## 2017-08-27 PROCEDURE — 25010000002 CEFTRIAXONE: Performed by: PHYSICIAN ASSISTANT

## 2017-08-27 PROCEDURE — 82607 VITAMIN B-12: CPT | Performed by: INTERNAL MEDICINE

## 2017-08-27 PROCEDURE — 94799 UNLISTED PULMONARY SVC/PX: CPT

## 2017-08-27 PROCEDURE — 63710000001 INSULIN ASPART PER 5 UNITS: Performed by: INTERNAL MEDICINE

## 2017-08-27 PROCEDURE — 85007 BL SMEAR W/DIFF WBC COUNT: CPT | Performed by: INTERNAL MEDICINE

## 2017-08-27 PROCEDURE — 83540 ASSAY OF IRON: CPT | Performed by: INTERNAL MEDICINE

## 2017-08-27 PROCEDURE — 25010000002 HEPARIN (PORCINE) PER 1000 UNITS: Performed by: PHYSICIAN ASSISTANT

## 2017-08-27 PROCEDURE — 84132 ASSAY OF SERUM POTASSIUM: CPT | Performed by: INTERNAL MEDICINE

## 2017-08-27 PROCEDURE — 82962 GLUCOSE BLOOD TEST: CPT

## 2017-08-27 PROCEDURE — 85025 COMPLETE CBC W/AUTO DIFF WBC: CPT | Performed by: INTERNAL MEDICINE

## 2017-08-27 PROCEDURE — 25010000002 HYDRALAZINE PER 20 MG: Performed by: PHYSICIAN ASSISTANT

## 2017-08-27 PROCEDURE — 80053 COMPREHEN METABOLIC PANEL: CPT | Performed by: INTERNAL MEDICINE

## 2017-08-27 PROCEDURE — 82746 ASSAY OF FOLIC ACID SERUM: CPT | Performed by: INTERNAL MEDICINE

## 2017-08-27 PROCEDURE — 63710000001 INSULIN DETEMIR PER 5 UNITS: Performed by: INTERNAL MEDICINE

## 2017-08-27 PROCEDURE — 83550 IRON BINDING TEST: CPT | Performed by: INTERNAL MEDICINE

## 2017-08-27 PROCEDURE — 84100 ASSAY OF PHOSPHORUS: CPT | Performed by: INTERNAL MEDICINE

## 2017-08-27 PROCEDURE — 99233 SBSQ HOSP IP/OBS HIGH 50: CPT | Performed by: INTERNAL MEDICINE

## 2017-08-27 PROCEDURE — 83735 ASSAY OF MAGNESIUM: CPT | Performed by: INTERNAL MEDICINE

## 2017-08-27 RX ORDER — POTASSIUM CHLORIDE 20 MEQ/1
40 TABLET, EXTENDED RELEASE ORAL EVERY 4 HOURS
Status: COMPLETED | OUTPATIENT
Start: 2017-08-27 | End: 2017-08-27

## 2017-08-27 RX ORDER — CARVEDILOL 6.25 MG/1
18.75 TABLET ORAL EVERY 12 HOURS SCHEDULED
Status: DISCONTINUED | OUTPATIENT
Start: 2017-08-27 | End: 2017-08-30 | Stop reason: HOSPADM

## 2017-08-27 RX ADMIN — INSULIN ASPART 2 UNITS: 100 INJECTION, SOLUTION INTRAVENOUS; SUBCUTANEOUS at 20:44

## 2017-08-27 RX ADMIN — POTASSIUM CHLORIDE 40 MEQ: 1500 TABLET, EXTENDED RELEASE ORAL at 12:18

## 2017-08-27 RX ADMIN — GABAPENTIN 800 MG: 400 CAPSULE ORAL at 17:21

## 2017-08-27 RX ADMIN — INSULIN DETEMIR 25 UNITS: 100 INJECTION, SOLUTION SUBCUTANEOUS at 08:40

## 2017-08-27 RX ADMIN — CARVEDILOL 18.75 MG: 6.25 TABLET, FILM COATED ORAL at 20:44

## 2017-08-27 RX ADMIN — HEPARIN SODIUM 5000 UNITS: 5000 INJECTION, SOLUTION INTRAVENOUS; SUBCUTANEOUS at 08:29

## 2017-08-27 RX ADMIN — HYDROCODONE BITARTRATE AND ACETAMINOPHEN 1 TABLET: 10; 325 TABLET ORAL at 03:51

## 2017-08-27 RX ADMIN — CEFTRIAXONE 2 G: 2 INJECTION, POWDER, FOR SOLUTION INTRAMUSCULAR; INTRAVENOUS at 12:18

## 2017-08-27 RX ADMIN — Medication 10 ML: at 20:44

## 2017-08-27 RX ADMIN — HYDROCODONE BITARTRATE AND ACETAMINOPHEN 1 TABLET: 10; 325 TABLET ORAL at 20:43

## 2017-08-27 RX ADMIN — INSULIN ASPART 10 UNITS: 100 INJECTION, SOLUTION INTRAVENOUS; SUBCUTANEOUS at 17:21

## 2017-08-27 RX ADMIN — LORAZEPAM 0.5 MG: 0.5 TABLET ORAL at 20:43

## 2017-08-27 RX ADMIN — INSULIN ASPART 3 UNITS: 100 INJECTION, SOLUTION INTRAVENOUS; SUBCUTANEOUS at 08:30

## 2017-08-27 RX ADMIN — CARVEDILOL 12.5 MG: 6.25 TABLET, FILM COATED ORAL at 08:30

## 2017-08-27 RX ADMIN — HEPARIN SODIUM 5000 UNITS: 5000 INJECTION, SOLUTION INTRAVENOUS; SUBCUTANEOUS at 20:44

## 2017-08-27 RX ADMIN — HYDRALAZINE HYDROCHLORIDE 10 MG: 20 INJECTION INTRAMUSCULAR; INTRAVENOUS at 20:44

## 2017-08-27 RX ADMIN — INSULIN ASPART 3 UNITS: 100 INJECTION, SOLUTION INTRAVENOUS; SUBCUTANEOUS at 12:18

## 2017-08-27 RX ADMIN — GABAPENTIN 800 MG: 400 CAPSULE ORAL at 20:43

## 2017-08-27 RX ADMIN — POTASSIUM CHLORIDE 40 MEQ: 1500 TABLET, EXTENDED RELEASE ORAL at 08:30

## 2017-08-27 RX ADMIN — INSULIN ASPART 8 UNITS: 100 INJECTION, SOLUTION INTRAVENOUS; SUBCUTANEOUS at 08:29

## 2017-08-27 RX ADMIN — AMLODIPINE BESYLATE 5 MG: 5 TABLET ORAL at 08:30

## 2017-08-27 RX ADMIN — HYDROCODONE BITARTRATE AND ACETAMINOPHEN 1 TABLET: 10; 325 TABLET ORAL at 12:18

## 2017-08-27 RX ADMIN — TIZANIDINE 4 MG: 4 TABLET ORAL at 20:44

## 2017-08-27 RX ADMIN — TIZANIDINE 4 MG: 4 TABLET ORAL at 03:51

## 2017-08-27 RX ADMIN — GABAPENTIN 800 MG: 400 CAPSULE ORAL at 08:30

## 2017-08-27 RX ADMIN — INSULIN ASPART 10 UNITS: 100 INJECTION, SOLUTION INTRAVENOUS; SUBCUTANEOUS at 12:00

## 2017-08-28 ENCOUNTER — APPOINTMENT (OUTPATIENT)
Dept: MRI IMAGING | Facility: HOSPITAL | Age: 47
End: 2017-08-28

## 2017-08-28 ENCOUNTER — APPOINTMENT (OUTPATIENT)
Dept: ULTRASOUND IMAGING | Facility: HOSPITAL | Age: 47
End: 2017-08-28

## 2017-08-28 LAB
ALBUMIN SERPL-MCNC: 3.1 G/DL (ref 3.5–5)
ALBUMIN/GLOB SERPL: 0.8 G/DL (ref 1.5–2.5)
ALP SERPL-CCNC: 97 U/L (ref 35–104)
ALT SERPL W P-5'-P-CCNC: 14 U/L (ref 10–36)
ANION GAP SERPL CALCULATED.3IONS-SCNC: 7.5 MMOL/L (ref 3.6–11.2)
AST SERPL-CCNC: 22 U/L (ref 10–30)
BACTERIA SPEC AEROBE CULT: NORMAL
BASOPHILS # BLD AUTO: 0.03 10*3/MM3 (ref 0–0.3)
BASOPHILS NFR BLD AUTO: 0.4 % (ref 0–2)
BILIRUB SERPL-MCNC: 0.2 MG/DL (ref 0.2–1.8)
BUN BLD-MCNC: 8 MG/DL (ref 7–21)
BUN/CREAT SERPL: 10.1 (ref 7–25)
CALCIUM SPEC-SCNC: 8.7 MG/DL (ref 7.7–10)
CHLORIDE SERPL-SCNC: 104 MMOL/L (ref 99–112)
CO2 SERPL-SCNC: 29.5 MMOL/L (ref 24.3–31.9)
CREAT BLD-MCNC: 0.79 MG/DL (ref 0.43–1.29)
CRP SERPL-MCNC: 7.31 MG/DL (ref 0–0.99)
DEPRECATED RDW RBC AUTO: 54.2 FL (ref 37–54)
EOSINOPHIL # BLD AUTO: 0.51 10*3/MM3 (ref 0–0.7)
EOSINOPHIL NFR BLD AUTO: 6.1 % (ref 0–5)
ERYTHROCYTE [DISTWIDTH] IN BLOOD BY AUTOMATED COUNT: 19.3 % (ref 11.5–14.5)
GFR SERPL CREATININE-BSD FRML MDRD: 78 ML/MIN/1.73
GLOBULIN UR ELPH-MCNC: 3.7 GM/DL
GLUCOSE BLD-MCNC: 203 MG/DL (ref 70–110)
GLUCOSE BLDC GLUCOMTR-MCNC: 187 MG/DL (ref 70–130)
GLUCOSE BLDC GLUCOMTR-MCNC: 221 MG/DL (ref 70–130)
GLUCOSE BLDC GLUCOMTR-MCNC: 258 MG/DL (ref 70–130)
GLUCOSE BLDC GLUCOMTR-MCNC: 420 MG/DL (ref 70–130)
HCT VFR BLD AUTO: 29.2 % (ref 37–47)
HGB BLD-MCNC: 8.5 G/DL (ref 12–16)
IMM GRANULOCYTES # BLD: 0.36 10*3/MM3 (ref 0–0.03)
IMM GRANULOCYTES NFR BLD: 4.3 % (ref 0–0.5)
LYMPHOCYTES # BLD AUTO: 2.14 10*3/MM3 (ref 1–3)
LYMPHOCYTES NFR BLD AUTO: 25.6 % (ref 21–51)
MCH RBC QN AUTO: 22.6 PG (ref 27–33)
MCHC RBC AUTO-ENTMCNC: 29.1 G/DL (ref 33–37)
MCV RBC AUTO: 77.7 FL (ref 80–94)
MONOCYTES # BLD AUTO: 0.83 10*3/MM3 (ref 0.1–0.9)
MONOCYTES NFR BLD AUTO: 9.9 % (ref 0–10)
NEUTROPHILS # BLD AUTO: 4.48 10*3/MM3 (ref 1.4–6.5)
NEUTROPHILS NFR BLD AUTO: 53.7 % (ref 30–70)
OSMOLALITY SERPL CALC.SUM OF ELEC: 285.4 MOSM/KG (ref 273–305)
PLATELET # BLD AUTO: 303 10*3/MM3 (ref 130–400)
PMV BLD AUTO: 8.8 FL (ref 6–10)
POTASSIUM BLD-SCNC: 4 MMOL/L (ref 3.5–5.3)
PROT SERPL-MCNC: 6.8 G/DL (ref 6–8)
RBC # BLD AUTO: 3.76 10*6/MM3 (ref 4.2–5.4)
SODIUM BLD-SCNC: 141 MMOL/L (ref 135–153)
WBC NRBC COR # BLD: 8.35 10*3/MM3 (ref 4.5–12.5)

## 2017-08-28 PROCEDURE — 93880 EXTRACRANIAL BILAT STUDY: CPT | Performed by: RADIOLOGY

## 2017-08-28 PROCEDURE — 25010000002 CEFTRIAXONE: Performed by: PHYSICIAN ASSISTANT

## 2017-08-28 PROCEDURE — 63710000001 INSULIN ASPART PER 5 UNITS: Performed by: INTERNAL MEDICINE

## 2017-08-28 PROCEDURE — 63710000001 INSULIN ASPART PER 5 UNITS: Performed by: PHYSICIAN ASSISTANT

## 2017-08-28 PROCEDURE — 72141 MRI NECK SPINE W/O DYE: CPT | Performed by: RADIOLOGY

## 2017-08-28 PROCEDURE — 25010000002 HEPARIN (PORCINE) PER 1000 UNITS: Performed by: PHYSICIAN ASSISTANT

## 2017-08-28 PROCEDURE — 97110 THERAPEUTIC EXERCISES: CPT

## 2017-08-28 PROCEDURE — 63710000001 INSULIN DETEMIR PER 5 UNITS: Performed by: INTERNAL MEDICINE

## 2017-08-28 PROCEDURE — 97163 PT EVAL HIGH COMPLEX 45 MIN: CPT

## 2017-08-28 PROCEDURE — 72141 MRI NECK SPINE W/O DYE: CPT

## 2017-08-28 PROCEDURE — G8988 SELF CARE GOAL STATUS: HCPCS

## 2017-08-28 PROCEDURE — 0 GADOBENATE DIMEGLUMINE 529 MG/ML SOLUTION: Performed by: INTERNAL MEDICINE

## 2017-08-28 PROCEDURE — 97167 OT EVAL HIGH COMPLEX 60 MIN: CPT

## 2017-08-28 PROCEDURE — A9577 INJ MULTIHANCE: HCPCS | Performed by: INTERNAL MEDICINE

## 2017-08-28 PROCEDURE — 93880 EXTRACRANIAL BILAT STUDY: CPT

## 2017-08-28 PROCEDURE — G8979 MOBILITY GOAL STATUS: HCPCS

## 2017-08-28 PROCEDURE — 94799 UNLISTED PULMONARY SVC/PX: CPT

## 2017-08-28 PROCEDURE — G8978 MOBILITY CURRENT STATUS: HCPCS

## 2017-08-28 PROCEDURE — 85025 COMPLETE CBC W/AUTO DIFF WBC: CPT | Performed by: INTERNAL MEDICINE

## 2017-08-28 PROCEDURE — G8987 SELF CARE CURRENT STATUS: HCPCS

## 2017-08-28 PROCEDURE — 82962 GLUCOSE BLOOD TEST: CPT

## 2017-08-28 PROCEDURE — 86140 C-REACTIVE PROTEIN: CPT | Performed by: INTERNAL MEDICINE

## 2017-08-28 PROCEDURE — 80053 COMPREHEN METABOLIC PANEL: CPT | Performed by: INTERNAL MEDICINE

## 2017-08-28 PROCEDURE — 70553 MRI BRAIN STEM W/O & W/DYE: CPT

## 2017-08-28 PROCEDURE — 99233 SBSQ HOSP IP/OBS HIGH 50: CPT | Performed by: INTERNAL MEDICINE

## 2017-08-28 PROCEDURE — 70553 MRI BRAIN STEM W/O & W/DYE: CPT | Performed by: RADIOLOGY

## 2017-08-28 RX ORDER — ATORVASTATIN CALCIUM 20 MG/1
20 TABLET, FILM COATED ORAL NIGHTLY
Status: DISCONTINUED | OUTPATIENT
Start: 2017-08-28 | End: 2017-08-30 | Stop reason: HOSPADM

## 2017-08-28 RX ORDER — ASPIRIN 325 MG
325 TABLET, DELAYED RELEASE (ENTERIC COATED) ORAL DAILY
Status: DISCONTINUED | OUTPATIENT
Start: 2017-08-28 | End: 2017-08-30 | Stop reason: HOSPADM

## 2017-08-28 RX ADMIN — GABAPENTIN 800 MG: 400 CAPSULE ORAL at 20:39

## 2017-08-28 RX ADMIN — HEPARIN SODIUM 5000 UNITS: 5000 INJECTION, SOLUTION INTRAVENOUS; SUBCUTANEOUS at 08:32

## 2017-08-28 RX ADMIN — CARVEDILOL 18.75 MG: 6.25 TABLET, FILM COATED ORAL at 08:32

## 2017-08-28 RX ADMIN — INSULIN ASPART 10 UNITS: 100 INJECTION, SOLUTION INTRAVENOUS; SUBCUTANEOUS at 16:48

## 2017-08-28 RX ADMIN — HYDROCODONE BITARTRATE AND ACETAMINOPHEN 1 TABLET: 10; 325 TABLET ORAL at 08:59

## 2017-08-28 RX ADMIN — INSULIN ASPART 4 UNITS: 100 INJECTION, SOLUTION INTRAVENOUS; SUBCUTANEOUS at 08:31

## 2017-08-28 RX ADMIN — GABAPENTIN 800 MG: 400 CAPSULE ORAL at 16:47

## 2017-08-28 RX ADMIN — CEFTRIAXONE 2 G: 2 INJECTION, POWDER, FOR SOLUTION INTRAMUSCULAR; INTRAVENOUS at 13:47

## 2017-08-28 RX ADMIN — GADOBENATE DIMEGLUMINE 17 ML: 529 INJECTION, SOLUTION INTRAVENOUS at 11:35

## 2017-08-28 RX ADMIN — GABAPENTIN 800 MG: 400 CAPSULE ORAL at 08:31

## 2017-08-28 RX ADMIN — HYDROCODONE BITARTRATE AND ACETAMINOPHEN 1 TABLET: 10; 325 TABLET ORAL at 16:52

## 2017-08-28 RX ADMIN — INSULIN ASPART 7 UNITS: 100 INJECTION, SOLUTION INTRAVENOUS; SUBCUTANEOUS at 13:47

## 2017-08-28 RX ADMIN — CARVEDILOL 18.75 MG: 6.25 TABLET, FILM COATED ORAL at 20:39

## 2017-08-28 RX ADMIN — INSULIN ASPART 10 UNITS: 100 INJECTION, SOLUTION INTRAVENOUS; SUBCUTANEOUS at 08:30

## 2017-08-28 RX ADMIN — HEPARIN SODIUM 5000 UNITS: 5000 INJECTION, SOLUTION INTRAVENOUS; SUBCUTANEOUS at 20:39

## 2017-08-28 RX ADMIN — INSULIN DETEMIR 30 UNITS: 100 INJECTION, SOLUTION SUBCUTANEOUS at 08:32

## 2017-08-28 RX ADMIN — ASPIRIN 325 MG: 325 TABLET, COATED ORAL at 20:39

## 2017-08-28 RX ADMIN — ATORVASTATIN CALCIUM 20 MG: 20 TABLET, FILM COATED ORAL at 20:39

## 2017-08-28 RX ADMIN — INSULIN ASPART 3 UNITS: 100 INJECTION, SOLUTION INTRAVENOUS; SUBCUTANEOUS at 16:47

## 2017-08-28 RX ADMIN — INSULIN ASPART 2 UNITS: 100 INJECTION, SOLUTION INTRAVENOUS; SUBCUTANEOUS at 20:39

## 2017-08-28 RX ADMIN — INSULIN ASPART 10 UNITS: 100 INJECTION, SOLUTION INTRAVENOUS; SUBCUTANEOUS at 13:47

## 2017-08-28 RX ADMIN — AMLODIPINE BESYLATE 5 MG: 5 TABLET ORAL at 08:32

## 2017-08-29 ENCOUNTER — APPOINTMENT (OUTPATIENT)
Dept: CARDIOLOGY | Facility: HOSPITAL | Age: 47
End: 2017-08-29
Attending: INTERNAL MEDICINE

## 2017-08-29 ENCOUNTER — APPOINTMENT (OUTPATIENT)
Dept: PREOP | Facility: HOSPITAL | Age: 47
End: 2017-08-29

## 2017-08-29 LAB
ALBUMIN SERPL-MCNC: 3.5 G/DL (ref 3.5–5)
ALBUMIN/GLOB SERPL: 0.9 G/DL (ref 1.5–2.5)
ALP SERPL-CCNC: 106 U/L (ref 35–104)
ALT SERPL W P-5'-P-CCNC: 16 U/L (ref 10–36)
ANION GAP SERPL CALCULATED.3IONS-SCNC: 6.8 MMOL/L (ref 3.6–11.2)
ANISOCYTOSIS BLD QL: NORMAL
AST SERPL-CCNC: 22 U/L (ref 10–30)
BASOPHILS # BLD AUTO: 0.04 10*3/MM3 (ref 0–0.3)
BASOPHILS NFR BLD AUTO: 0.4 % (ref 0–2)
BH CV ECHO MEAS - ACS: 2.1 CM
BH CV ECHO MEAS - AO ROOT AREA (BSA CORRECTED): 1.8
BH CV ECHO MEAS - AO ROOT AREA: 9.5 CM^2
BH CV ECHO MEAS - AO ROOT DIAM: 3.5 CM
BH CV ECHO MEAS - BSA(HAYCOCK): 2 M^2
BH CV ECHO MEAS - BSA: 1.9 M^2
BH CV ECHO MEAS - BZI_BMI: 34.2 KILOGRAMS/M^2
BH CV ECHO MEAS - BZI_METRIC_HEIGHT: 160 CM
BH CV ECHO MEAS - BZI_METRIC_WEIGHT: 87.5 KG
BH CV ECHO MEAS - CONTRAST EF 4CH: 61 ML/M^2
BH CV ECHO MEAS - EDV(CUBED): 3.1 ML
BH CV ECHO MEAS - EDV(MOD-SP4): 59 ML
BH CV ECHO MEAS - EDV(TEICH): 5.6 ML
BH CV ECHO MEAS - EF(CUBED): -815.9 %
BH CV ECHO MEAS - EF(MOD-SP4): 61 %
BH CV ECHO MEAS - EF(TEICH): -548.2 %
BH CV ECHO MEAS - ESV(CUBED): 28.4 ML
BH CV ECHO MEAS - ESV(MOD-SP4): 23 ML
BH CV ECHO MEAS - ESV(TEICH): 36.5 ML
BH CV ECHO MEAS - FS: -109.2 %
BH CV ECHO MEAS - IVSD: 5 CM
BH CV ECHO MEAS - LA DIMENSION: 4.2 CM
BH CV ECHO MEAS - LA/AO: 1.2
BH CV ECHO MEAS - LV DIASTOLIC VOL/BSA (35-75): 31 ML/M^2
BH CV ECHO MEAS - LV SYSTOLIC VOL/BSA (12-30): 12.1 ML/M^2
BH CV ECHO MEAS - LVIDD: 1.5 CM
BH CV ECHO MEAS - LVIDS: 3.1 CM
BH CV ECHO MEAS - LVLD AP4: 8.3 CM
BH CV ECHO MEAS - LVLS AP4: 7.5 CM
BH CV ECHO MEAS - LVOT AREA (M): 2.8 CM^2
BH CV ECHO MEAS - LVOT AREA: 2.9 CM^2
BH CV ECHO MEAS - LVOT DIAM: 1.9 CM
BH CV ECHO MEAS - MV A MAX VEL: 87.9 CM/SEC
BH CV ECHO MEAS - MV E MAX VEL: 61.2 CM/SEC
BH CV ECHO MEAS - MV E/A: 0.7
BH CV ECHO MEAS - PA ACC SLOPE: 1228 CM/SEC^2
BH CV ECHO MEAS - PA ACC TIME: 0.1 SEC
BH CV ECHO MEAS - PA PR(ACCEL): 36.2 MMHG
BH CV ECHO MEAS - RVDD: 2.4 CM
BH CV ECHO MEAS - SI(CUBED): -13.3 ML/M^2
BH CV ECHO MEAS - SI(MOD-SP4): 18.9 ML/M^2
BH CV ECHO MEAS - SI(TEICH): -16.2 ML/M^2
BH CV ECHO MEAS - SV(CUBED): -25.3 ML
BH CV ECHO MEAS - SV(MOD-SP4): 36 ML
BH CV ECHO MEAS - SV(TEICH): -30.9 ML
BILIRUB SERPL-MCNC: 0.2 MG/DL (ref 0.2–1.8)
BUN BLD-MCNC: 8 MG/DL (ref 7–21)
BUN/CREAT SERPL: 7.6 (ref 7–25)
CALCIUM SPEC-SCNC: 9.4 MG/DL (ref 7.7–10)
CHLORIDE SERPL-SCNC: 103 MMOL/L (ref 99–112)
CO2 SERPL-SCNC: 32.2 MMOL/L (ref 24.3–31.9)
CREAT BLD-MCNC: 1.05 MG/DL (ref 0.43–1.29)
CRP SERPL-MCNC: 5.1 MG/DL (ref 0–0.99)
DEPRECATED RDW RBC AUTO: 53.4 FL (ref 37–54)
EOSINOPHIL # BLD AUTO: 0.49 10*3/MM3 (ref 0–0.7)
EOSINOPHIL NFR BLD AUTO: 5.5 % (ref 0–5)
ERYTHROCYTE [DISTWIDTH] IN BLOOD BY AUTOMATED COUNT: 19.8 % (ref 11.5–14.5)
GFR SERPL CREATININE-BSD FRML MDRD: 56 ML/MIN/1.73
GLOBULIN UR ELPH-MCNC: 4.1 GM/DL
GLUCOSE BLD-MCNC: 178 MG/DL (ref 70–110)
GLUCOSE BLDC GLUCOMTR-MCNC: 238 MG/DL (ref 70–130)
GLUCOSE BLDC GLUCOMTR-MCNC: 252 MG/DL (ref 70–130)
GLUCOSE BLDC GLUCOMTR-MCNC: 285 MG/DL (ref 70–130)
GLUCOSE BLDC GLUCOMTR-MCNC: 322 MG/DL (ref 70–130)
GLUCOSE BLDC GLUCOMTR-MCNC: 331 MG/DL (ref 70–130)
HCT VFR BLD AUTO: 30.9 % (ref 37–47)
HCT VFR BLD AUTO: 31.4 % (ref 37–47)
HGB BLD-MCNC: 9.1 G/DL (ref 12–16)
HGB BLD-MCNC: 9.3 G/DL (ref 12–16)
HYPOCHROMIA BLD QL: NORMAL
IMM GRANULOCYTES # BLD: 0.37 10*3/MM3 (ref 0–0.03)
IMM GRANULOCYTES NFR BLD: 4.1 % (ref 0–0.5)
LYMPHOCYTES # BLD AUTO: 2.44 10*3/MM3 (ref 1–3)
LYMPHOCYTES NFR BLD AUTO: 27.4 % (ref 21–51)
MAGNESIUM SERPL-MCNC: 1.8 MG/DL (ref 1.7–2.6)
MCH RBC QN AUTO: 23.3 PG (ref 27–33)
MCHC RBC AUTO-ENTMCNC: 29.6 G/DL (ref 33–37)
MCV RBC AUTO: 78.5 FL (ref 80–94)
MICROCYTES BLD QL: NORMAL
MONOCYTES # BLD AUTO: 0.79 10*3/MM3 (ref 0.1–0.9)
MONOCYTES NFR BLD AUTO: 8.9 % (ref 0–10)
NEUTROPHILS # BLD AUTO: 4.79 10*3/MM3 (ref 1.4–6.5)
NEUTROPHILS NFR BLD AUTO: 53.7 % (ref 30–70)
OSMOLALITY SERPL CALC.SUM OF ELEC: 285.9 MOSM/KG (ref 273–305)
PHOSPHATE SERPL-MCNC: 3.6 MG/DL (ref 2.7–4.5)
PLAT MORPH BLD: NORMAL
PLATELET # BLD AUTO: 366 10*3/MM3 (ref 130–400)
PMV BLD AUTO: 8.3 FL (ref 6–10)
POTASSIUM BLD-SCNC: 3.8 MMOL/L (ref 3.5–5.3)
PROT SERPL-MCNC: 7.6 G/DL (ref 6–8)
RBC # BLD AUTO: 4 10*6/MM3 (ref 4.2–5.4)
SODIUM BLD-SCNC: 142 MMOL/L (ref 135–153)
WBC NRBC COR # BLD: 8.92 10*3/MM3 (ref 4.5–12.5)

## 2017-08-29 PROCEDURE — 83735 ASSAY OF MAGNESIUM: CPT | Performed by: INTERNAL MEDICINE

## 2017-08-29 PROCEDURE — 80053 COMPREHEN METABOLIC PANEL: CPT | Performed by: INTERNAL MEDICINE

## 2017-08-29 PROCEDURE — 85014 HEMATOCRIT: CPT | Performed by: INTERNAL MEDICINE

## 2017-08-29 PROCEDURE — 86140 C-REACTIVE PROTEIN: CPT | Performed by: INTERNAL MEDICINE

## 2017-08-29 PROCEDURE — 85018 HEMOGLOBIN: CPT | Performed by: INTERNAL MEDICINE

## 2017-08-29 PROCEDURE — 63710000001 INSULIN ASPART PER 5 UNITS: Performed by: PHYSICIAN ASSISTANT

## 2017-08-29 PROCEDURE — 82962 GLUCOSE BLOOD TEST: CPT

## 2017-08-29 PROCEDURE — 63710000001 INSULIN ASPART PER 5 UNITS: Performed by: INTERNAL MEDICINE

## 2017-08-29 PROCEDURE — 97110 THERAPEUTIC EXERCISES: CPT

## 2017-08-29 PROCEDURE — 25010000002 HEPARIN (PORCINE) PER 1000 UNITS: Performed by: PHYSICIAN ASSISTANT

## 2017-08-29 PROCEDURE — 99232 SBSQ HOSP IP/OBS MODERATE 35: CPT | Performed by: INTERNAL MEDICINE

## 2017-08-29 PROCEDURE — 85007 BL SMEAR W/DIFF WBC COUNT: CPT | Performed by: INTERNAL MEDICINE

## 2017-08-29 PROCEDURE — 63710000001 INSULIN DETEMIR PER 5 UNITS: Performed by: PHYSICIAN ASSISTANT

## 2017-08-29 PROCEDURE — 94799 UNLISTED PULMONARY SVC/PX: CPT

## 2017-08-29 PROCEDURE — 85025 COMPLETE CBC W/AUTO DIFF WBC: CPT | Performed by: INTERNAL MEDICINE

## 2017-08-29 PROCEDURE — 84100 ASSAY OF PHOSPHORUS: CPT | Performed by: INTERNAL MEDICINE

## 2017-08-29 PROCEDURE — 93306 TTE W/DOPPLER COMPLETE: CPT

## 2017-08-29 PROCEDURE — 93306 TTE W/DOPPLER COMPLETE: CPT | Performed by: INTERNAL MEDICINE

## 2017-08-29 PROCEDURE — 97530 THERAPEUTIC ACTIVITIES: CPT

## 2017-08-29 RX ORDER — CEFDINIR 300 MG/1
300 CAPSULE ORAL EVERY 12 HOURS SCHEDULED
Status: DISCONTINUED | OUTPATIENT
Start: 2017-08-29 | End: 2017-08-29

## 2017-08-29 RX ORDER — CEFDINIR 300 MG/1
300 CAPSULE ORAL EVERY 12 HOURS SCHEDULED
Status: DISCONTINUED | OUTPATIENT
Start: 2017-08-29 | End: 2017-08-30 | Stop reason: HOSPADM

## 2017-08-29 RX ADMIN — INSULIN ASPART 3 UNITS: 100 INJECTION, SOLUTION INTRAVENOUS; SUBCUTANEOUS at 16:47

## 2017-08-29 RX ADMIN — INSULIN ASPART 4 UNITS: 100 INJECTION, SOLUTION INTRAVENOUS; SUBCUTANEOUS at 07:45

## 2017-08-29 RX ADMIN — HYDROCODONE BITARTRATE AND ACETAMINOPHEN 1 TABLET: 10; 325 TABLET ORAL at 20:21

## 2017-08-29 RX ADMIN — ASPIRIN 325 MG: 325 TABLET, COATED ORAL at 07:46

## 2017-08-29 RX ADMIN — HYDROCODONE BITARTRATE AND ACETAMINOPHEN 1 TABLET: 10; 325 TABLET ORAL at 08:22

## 2017-08-29 RX ADMIN — INSULIN ASPART 5 UNITS: 100 INJECTION, SOLUTION INTRAVENOUS; SUBCUTANEOUS at 11:30

## 2017-08-29 RX ADMIN — AMLODIPINE BESYLATE 5 MG: 5 TABLET ORAL at 07:46

## 2017-08-29 RX ADMIN — CARVEDILOL 18.75 MG: 6.25 TABLET, FILM COATED ORAL at 07:46

## 2017-08-29 RX ADMIN — HEPARIN SODIUM 5000 UNITS: 5000 INJECTION, SOLUTION INTRAVENOUS; SUBCUTANEOUS at 20:11

## 2017-08-29 RX ADMIN — INSULIN ASPART 3 UNITS: 100 INJECTION, SOLUTION INTRAVENOUS; SUBCUTANEOUS at 20:12

## 2017-08-29 RX ADMIN — CEFDINIR 300 MG: 300 CAPSULE ORAL at 10:26

## 2017-08-29 RX ADMIN — HEPARIN SODIUM 5000 UNITS: 5000 INJECTION, SOLUTION INTRAVENOUS; SUBCUTANEOUS at 07:45

## 2017-08-29 RX ADMIN — GABAPENTIN 800 MG: 400 CAPSULE ORAL at 20:11

## 2017-08-29 RX ADMIN — CARVEDILOL 18.75 MG: 6.25 TABLET, FILM COATED ORAL at 20:11

## 2017-08-29 RX ADMIN — GABAPENTIN 800 MG: 400 CAPSULE ORAL at 07:45

## 2017-08-29 RX ADMIN — GABAPENTIN 800 MG: 400 CAPSULE ORAL at 15:49

## 2017-08-29 RX ADMIN — INSULIN ASPART 10 UNITS: 100 INJECTION, SOLUTION INTRAVENOUS; SUBCUTANEOUS at 16:48

## 2017-08-29 RX ADMIN — INSULIN ASPART 10 UNITS: 100 INJECTION, SOLUTION INTRAVENOUS; SUBCUTANEOUS at 07:44

## 2017-08-29 RX ADMIN — INSULIN DETEMIR 35 UNITS: 100 INJECTION, SOLUTION SUBCUTANEOUS at 10:00

## 2017-08-29 RX ADMIN — ATORVASTATIN CALCIUM 20 MG: 20 TABLET, FILM COATED ORAL at 20:11

## 2017-08-29 RX ADMIN — INSULIN ASPART 10 UNITS: 100 INJECTION, SOLUTION INTRAVENOUS; SUBCUTANEOUS at 11:30

## 2017-08-29 RX ADMIN — CEFDINIR 300 MG: 300 CAPSULE ORAL at 20:11

## 2017-08-30 VITALS
BODY MASS INDEX: 34.45 KG/M2 | WEIGHT: 194.4 LBS | SYSTOLIC BLOOD PRESSURE: 154 MMHG | HEART RATE: 106 BPM | OXYGEN SATURATION: 93 % | HEIGHT: 63 IN | RESPIRATION RATE: 20 BRPM | DIASTOLIC BLOOD PRESSURE: 90 MMHG | TEMPERATURE: 98.6 F

## 2017-08-30 LAB
ALBUMIN SERPL-MCNC: 3.5 G/DL (ref 3.5–5)
ALBUMIN/GLOB SERPL: 0.8 G/DL (ref 1.5–2.5)
ALP SERPL-CCNC: 104 U/L (ref 35–104)
ALT SERPL W P-5'-P-CCNC: 14 U/L (ref 10–36)
ANION GAP SERPL CALCULATED.3IONS-SCNC: 9.5 MMOL/L (ref 3.6–11.2)
AST SERPL-CCNC: 25 U/L (ref 10–30)
BACTERIA SPEC AEROBE CULT: NORMAL
BASOPHILS # BLD AUTO: 0.03 10*3/MM3 (ref 0–0.3)
BASOPHILS NFR BLD AUTO: 0.3 % (ref 0–2)
BILIRUB SERPL-MCNC: 0.2 MG/DL (ref 0.2–1.8)
BUN BLD-MCNC: 9 MG/DL (ref 7–21)
BUN/CREAT SERPL: 10.5 (ref 7–25)
CALCIUM SPEC-SCNC: 9.7 MG/DL (ref 7.7–10)
CHLORIDE SERPL-SCNC: 101 MMOL/L (ref 99–112)
CO2 SERPL-SCNC: 30.5 MMOL/L (ref 24.3–31.9)
CREAT BLD-MCNC: 0.86 MG/DL (ref 0.43–1.29)
CRP SERPL-MCNC: 2.95 MG/DL (ref 0–0.99)
DEPRECATED RDW RBC AUTO: 53.9 FL (ref 37–54)
EOSINOPHIL # BLD AUTO: 0.58 10*3/MM3 (ref 0–0.7)
EOSINOPHIL NFR BLD AUTO: 5.3 % (ref 0–5)
ERYTHROCYTE [DISTWIDTH] IN BLOOD BY AUTOMATED COUNT: 19.9 % (ref 11.5–14.5)
GFR SERPL CREATININE-BSD FRML MDRD: 71 ML/MIN/1.73
GLOBULIN UR ELPH-MCNC: 4.2 GM/DL
GLUCOSE BLD-MCNC: 191 MG/DL (ref 70–110)
GLUCOSE BLDC GLUCOMTR-MCNC: 344 MG/DL (ref 70–130)
GLUCOSE BLDC GLUCOMTR-MCNC: 358 MG/DL (ref 70–130)
HCT VFR BLD AUTO: 30.9 % (ref 37–47)
HGB BLD-MCNC: 9.2 G/DL (ref 12–16)
IMM GRANULOCYTES # BLD: 0.29 10*3/MM3 (ref 0–0.03)
IMM GRANULOCYTES NFR BLD: 2.7 % (ref 0–0.5)
LYMPHOCYTES # BLD AUTO: 2.96 10*3/MM3 (ref 1–3)
LYMPHOCYTES NFR BLD AUTO: 27.2 % (ref 21–51)
MAGNESIUM SERPL-MCNC: 1.8 MG/DL (ref 1.7–2.6)
MCH RBC QN AUTO: 23.6 PG (ref 27–33)
MCHC RBC AUTO-ENTMCNC: 29.8 G/DL (ref 33–37)
MCV RBC AUTO: 79.2 FL (ref 80–94)
MONOCYTES # BLD AUTO: 0.96 10*3/MM3 (ref 0.1–0.9)
MONOCYTES NFR BLD AUTO: 8.8 % (ref 0–10)
NEUTROPHILS # BLD AUTO: 6.07 10*3/MM3 (ref 1.4–6.5)
NEUTROPHILS NFR BLD AUTO: 55.7 % (ref 30–70)
OSMOLALITY SERPL CALC.SUM OF ELEC: 285.1 MOSM/KG (ref 273–305)
PHOSPHATE SERPL-MCNC: 3.6 MG/DL (ref 2.7–4.5)
PLATELET # BLD AUTO: 441 10*3/MM3 (ref 130–400)
PMV BLD AUTO: 8.8 FL (ref 6–10)
POTASSIUM BLD-SCNC: 3.8 MMOL/L (ref 3.5–5.3)
PROT SERPL-MCNC: 7.7 G/DL (ref 6–8)
RBC # BLD AUTO: 3.9 10*6/MM3 (ref 4.2–5.4)
SODIUM BLD-SCNC: 141 MMOL/L (ref 135–153)
WBC NRBC COR # BLD: 10.89 10*3/MM3 (ref 4.5–12.5)

## 2017-08-30 PROCEDURE — 99239 HOSP IP/OBS DSCHRG MGMT >30: CPT | Performed by: INTERNAL MEDICINE

## 2017-08-30 PROCEDURE — 25010000002 HYDRALAZINE PER 20 MG: Performed by: PHYSICIAN ASSISTANT

## 2017-08-30 PROCEDURE — 83735 ASSAY OF MAGNESIUM: CPT | Performed by: PHYSICIAN ASSISTANT

## 2017-08-30 PROCEDURE — 86140 C-REACTIVE PROTEIN: CPT | Performed by: PHYSICIAN ASSISTANT

## 2017-08-30 PROCEDURE — 80053 COMPREHEN METABOLIC PANEL: CPT | Performed by: PHYSICIAN ASSISTANT

## 2017-08-30 PROCEDURE — 63710000001 INSULIN ASPART PER 5 UNITS: Performed by: PHYSICIAN ASSISTANT

## 2017-08-30 PROCEDURE — 94799 UNLISTED PULMONARY SVC/PX: CPT

## 2017-08-30 PROCEDURE — 63710000001 INSULIN DETEMIR PER 5 UNITS: Performed by: PHYSICIAN ASSISTANT

## 2017-08-30 PROCEDURE — 25010000002 HEPARIN (PORCINE) PER 1000 UNITS: Performed by: PHYSICIAN ASSISTANT

## 2017-08-30 PROCEDURE — 85025 COMPLETE CBC W/AUTO DIFF WBC: CPT | Performed by: PHYSICIAN ASSISTANT

## 2017-08-30 PROCEDURE — 84100 ASSAY OF PHOSPHORUS: CPT | Performed by: PHYSICIAN ASSISTANT

## 2017-08-30 PROCEDURE — 63710000001 INSULIN ASPART PER 5 UNITS: Performed by: INTERNAL MEDICINE

## 2017-08-30 PROCEDURE — 82962 GLUCOSE BLOOD TEST: CPT

## 2017-08-30 RX ORDER — UBIQUINOL 100 MG
1 CAPSULE ORAL 4 TIMES DAILY
Qty: 120 EACH | Refills: 0 | Status: SHIPPED | OUTPATIENT
Start: 2017-08-30 | End: 2019-03-17

## 2017-08-30 RX ORDER — BLOOD SUGAR DIAGNOSTIC
1 STRIP MISCELLANEOUS 4 TIMES DAILY
Qty: 120 EACH | Refills: 1 | Status: SHIPPED | OUTPATIENT
Start: 2017-08-30 | End: 2019-03-17

## 2017-08-30 RX ORDER — CEFDINIR 300 MG/1
300 CAPSULE ORAL EVERY 12 HOURS SCHEDULED
Qty: 9 CAPSULE | Refills: 0 | Status: SHIPPED | OUTPATIENT
Start: 2017-08-30 | End: 2017-09-04

## 2017-08-30 RX ORDER — AMLODIPINE BESYLATE 5 MG/1
5 TABLET ORAL
Qty: 30 TABLET | Refills: 0 | Status: SHIPPED | OUTPATIENT
Start: 2017-08-30 | End: 2017-09-29

## 2017-08-30 RX ORDER — INSULIN GLARGINE 100 [IU]/ML
35 INJECTION, SOLUTION SUBCUTANEOUS DAILY
Qty: 15 ML | Refills: 1 | Status: ON HOLD | OUTPATIENT
Start: 2017-08-30 | End: 2019-03-17

## 2017-08-30 RX ORDER — ASPIRIN 81 MG/1
81 TABLET ORAL DAILY
Start: 2017-08-30

## 2017-08-30 RX ORDER — ATORVASTATIN CALCIUM 20 MG/1
20 TABLET, FILM COATED ORAL NIGHTLY
Qty: 30 TABLET | Refills: 0 | Status: SHIPPED | OUTPATIENT
Start: 2017-08-30 | End: 2017-09-29

## 2017-08-30 RX ORDER — CARVEDILOL 12.5 MG/1
18.75 TABLET ORAL EVERY 12 HOURS SCHEDULED
Qty: 90 TABLET | Refills: 0 | Status: SHIPPED | OUTPATIENT
Start: 2017-08-30 | End: 2017-09-29

## 2017-08-30 RX ADMIN — ASPIRIN 325 MG: 325 TABLET, COATED ORAL at 09:13

## 2017-08-30 RX ADMIN — CARVEDILOL 18.75 MG: 6.25 TABLET, FILM COATED ORAL at 09:12

## 2017-08-30 RX ADMIN — INSULIN ASPART 5 UNITS: 100 INJECTION, SOLUTION INTRAVENOUS; SUBCUTANEOUS at 13:00

## 2017-08-30 RX ADMIN — HYDRALAZINE HYDROCHLORIDE 10 MG: 20 INJECTION INTRAMUSCULAR; INTRAVENOUS at 06:22

## 2017-08-30 RX ADMIN — INSULIN ASPART 10 UNITS: 100 INJECTION, SOLUTION INTRAVENOUS; SUBCUTANEOUS at 09:13

## 2017-08-30 RX ADMIN — CEFDINIR 300 MG: 300 CAPSULE ORAL at 09:13

## 2017-08-30 RX ADMIN — INSULIN DETEMIR 35 UNITS: 100 INJECTION, SOLUTION SUBCUTANEOUS at 09:15

## 2017-08-30 RX ADMIN — HEPARIN SODIUM 5000 UNITS: 5000 INJECTION, SOLUTION INTRAVENOUS; SUBCUTANEOUS at 09:13

## 2017-08-30 RX ADMIN — INSULIN ASPART 5 UNITS: 100 INJECTION, SOLUTION INTRAVENOUS; SUBCUTANEOUS at 09:13

## 2017-08-30 RX ADMIN — AMLODIPINE BESYLATE 5 MG: 5 TABLET ORAL at 09:12

## 2017-08-30 RX ADMIN — HYDROCODONE BITARTRATE AND ACETAMINOPHEN 1 TABLET: 10; 325 TABLET ORAL at 09:16

## 2017-08-30 RX ADMIN — INSULIN ASPART 10 UNITS: 100 INJECTION, SOLUTION INTRAVENOUS; SUBCUTANEOUS at 12:59

## 2017-08-30 RX ADMIN — GABAPENTIN 800 MG: 400 CAPSULE ORAL at 09:13

## 2017-11-17 ENCOUNTER — OFFICE VISIT (OUTPATIENT)
Dept: NEUROSURGERY | Facility: CLINIC | Age: 47
End: 2017-11-17

## 2017-11-17 VITALS — HEIGHT: 63 IN | TEMPERATURE: 98 F

## 2017-11-17 DIAGNOSIS — M53.9 MULTILEVEL DEGENERATIVE DISC DISEASE: Primary | ICD-10-CM

## 2017-11-17 DIAGNOSIS — G31.9 BRAIN ATROPHY (HCC): ICD-10-CM

## 2017-11-17 DIAGNOSIS — R29.898 WEAKNESS OF BOTH LOWER EXTREMITIES: ICD-10-CM

## 2017-11-17 DIAGNOSIS — Z86.73 HISTORY OF STROKE: ICD-10-CM

## 2017-11-17 PROCEDURE — 99203 OFFICE O/P NEW LOW 30 MIN: CPT | Performed by: NEUROLOGICAL SURGERY

## 2017-11-17 RX ORDER — SYRINGE AND NEEDLE,INSULIN,1ML 31GX15/64"
SYRINGE, EMPTY DISPOSABLE MISCELLANEOUS
COMMUNITY
Start: 2017-10-20 | End: 2019-03-17

## 2017-11-17 RX ORDER — PEN NEEDLE, DIABETIC 29 G X1/2"
NEEDLE, DISPOSABLE MISCELLANEOUS
COMMUNITY
Start: 2017-08-25 | End: 2019-03-17

## 2017-11-17 RX ORDER — AMLODIPINE BESYLATE 5 MG/1
5 TABLET ORAL DAILY
COMMUNITY
Start: 2017-10-23

## 2017-11-17 RX ORDER — ATORVASTATIN CALCIUM 20 MG/1
20 TABLET, FILM COATED ORAL NIGHTLY
COMMUNITY
Start: 2017-10-23

## 2017-11-17 RX ORDER — LANCETS 33 GAUGE
EACH MISCELLANEOUS
COMMUNITY
Start: 2017-11-12 | End: 2019-03-17

## 2017-11-17 RX ORDER — PROMETHAZINE HYDROCHLORIDE 25 MG/1
TABLET ORAL
Status: ON HOLD | COMMUNITY
Start: 2017-09-25 | End: 2019-03-17

## 2017-11-17 RX ORDER — BLOOD-GLUCOSE METER
KIT MISCELLANEOUS
COMMUNITY
Start: 2017-08-31 | End: 2019-03-17

## 2017-11-17 NOTE — PROGRESS NOTES
"Yissel Lopez  1970  [id number]      Chief Complaint   Patient presents with   • Back Pain   • Leg Pain   • Neck Pain   • Numbness   • Balance Issues       HISTORY OF PRESENT ILLNESS:  [This is a 47-year-old female who has a several year of progressive neurological deterioration being manifest primarily by unsteadiness, loss of balance, and inability to walk independently.  This is gotten progressively worse to the point that she is now virtually wheelchair bound.  She has seen a neurologist who was of the opinion that she had severe diabetes and secondary neuropathy which explained her symptoms.  There was one question raised to the possibility of multiple sclerosis which has not been completely resolved.  A cervical MRI was performed showing spinal stenosis and she is referred for neurosurgical consultation.    The symptomatology with which she presents and its progression are not that associated with a cervical myelopathy.  Furthermore the cervical MRI does not show abnormal signal within the spinal cord.  Therefore the likelihood of this being related to cervical myelopathy secondary to degenerative osteoarthritic changes in the cervical spine is remote.  She apparently has had lumbar MRI performed in the past showing \"spurs\".  Nevertheless, her symptom complex are not that of neurogenic claudication. ]    Past Medical History:   Diagnosis Date   • Diabetes mellitus    • Hypertension    • Multiple sclerosis        Past Surgical History:   Procedure Laterality Date   • CHOLECYSTECTOMY     • TONSILLECTOMY         Family History   Problem Relation Age of Onset   • Stroke Mother    • Diabetes Mother    • Cancer Father      Pancreatic CA       Social History     Social History   • Marital status:      Spouse name: N/A   • Number of children: N/A   • Years of education: N/A     Occupational History   • Not on file.     Social History Main Topics   • Smoking status: Never Smoker   • Smokeless tobacco: Not " "on file   • Alcohol use No   • Drug use: Not on file   • Sexual activity: Not on file     Other Topics Concern   • Not on file     Social History Narrative    Patient is .        Allergies   Allergen Reactions   • Erythromycin    • Tramadol          Current Outpatient Prescriptions:   •  Alcohol Swabs (ALCOHOL PREP) 70 % pads, 1 pad 4 (Four) Times a Day., Disp: 120 each, Rfl: 0  •  aspirin EC 81 MG EC tablet, Take 1 tablet by mouth Daily., Disp: , Rfl:   •  cyanocobalamin 1000 MCG/ML injection, Inject 1,000 mcg into the shoulder, thigh, or buttocks 1 (One) Time Per Week. tuesdays, Disp: , Rfl:   •  gabapentin (NEURONTIN) 600 MG tablet, Take 1,200 mg by mouth 3 (Three) Times a Day., Disp: , Rfl:   •  glyBURIDE-metFORMIN (GLUCOVANCE) 5-500 MG per tablet, Take 1 tablet by mouth 3 (Three) Times a Day., Disp: , Rfl:   •  HYDROcodone-acetaminophen (NORCO)  MG per tablet, Take 1 tablet by mouth 3 (Three) Times a Day., Disp: , Rfl:   •  insulin aspart (novoLOG) 100 UNIT/ML injection, Inject 10 Units under the skin 3 (Three) Times a Day With Meals. Use for the sliding scale too., Disp: 20 mL, Rfl: 0  •  insulin aspart (novoLOG) 100 UNIT/ML injection, Inject 0-7 Units under the skin 4 (Four) Times a Day Before Meals & at Bedtime., Disp: , Rfl: 12  •  Insulin Glargine (BASAGLAR KWIKPEN) 100 UNIT/ML injection pen, Inject 35 Units under the skin Daily., Disp: 15 mL, Rfl: 1  •  Insulin Syringe-Needle U-100 31G X 5/16\" 0.3 ML misc, Use as directed., Disp: 120 each, Rfl: 1  •  LORazepam (ATIVAN) 0.5 MG tablet, Take 0.5 mg by mouth Daily As Needed for Anxiety. 1-2 tablets daily prn, Disp: , Rfl:   •  naproxen (NAPROSYN) 500 MG tablet, Take 1,000 mg by mouth 2 (Two) Times a Day As Needed., Disp: , Rfl:   •  tiZANidine (ZANAFLEX) 4 MG tablet, Take 4 mg by mouth 3 (Three) Times a Day As Needed. Take 1 to 2 tablets by mouth 3 times a day., Disp: , Rfl:   •  Umeclidinium Bromide (INCRUSE ELLIPTA) 62.5 MCG/INH aerosol powder " , Inhale 1 puff Daily., Disp: , Rfl:     Review of Systems   Constitutional: Positive for activity change and fatigue. Negative for appetite change, chills, diaphoresis, fever and unexpected weight change.   HENT: Positive for congestion, dental problem, drooling, ear pain, hearing loss, postnasal drip, rhinorrhea, sinus pain, sinus pressure, sneezing, sore throat, tinnitus and trouble swallowing. Negative for ear discharge, facial swelling, mouth sores, nosebleeds and voice change.    Eyes: Positive for pain and itching. Negative for photophobia, discharge, redness and visual disturbance.   Respiratory: Positive for choking and shortness of breath. Negative for apnea, cough, chest tightness, wheezing and stridor.    Cardiovascular: Positive for leg swelling. Negative for chest pain and palpitations.   Gastrointestinal: Positive for constipation and nausea. Negative for abdominal distention, abdominal pain, anal bleeding, blood in stool, diarrhea, rectal pain and vomiting.   Endocrine: Positive for cold intolerance and heat intolerance. Negative for polydipsia, polyphagia and polyuria.   Genitourinary: Positive for flank pain and urgency. Negative for decreased urine volume, difficulty urinating, dysuria, enuresis, frequency, genital sores and hematuria.   Musculoskeletal: Positive for arthralgias, back pain, gait problem, joint swelling, myalgias, neck pain and neck stiffness.   Skin: Negative for color change, pallor, rash and wound.   Allergic/Immunologic: Negative for environmental allergies, food allergies and immunocompromised state.   Neurological: Positive for dizziness, tremors, speech difficulty, weakness, light-headedness, numbness and headaches. Negative for seizures, syncope and facial asymmetry.   Hematological: Negative for adenopathy. Does not bruise/bleed easily.   Psychiatric/Behavioral: Positive for confusion, decreased concentration and dysphoric mood. Negative for agitation, behavioral problems,  "hallucinations, self-injury, sleep disturbance and suicidal ideas. The patient is not nervous/anxious and is not hyperactive.        Vitals:    11/17/17 1104   Temp: 98 °F (36.7 °C)   Height: 63\" (160 cm)       Neurological Examination:  Mental status/speech: The patient is alert and oriented.  Speech is clear without aphysia or dysarthria.  No overt cognitive deficits.    Cranial nerve examination:    Olfaction: Smell is intact.  Vision: Vision is intact without visual field abnormalities.  Funduscopic examination is normal.  No pupillary irregularity.  Ocular motor examination: The extraocular muscles are intact.  There is no diplopia.  The pupil is round and reactive to both light and accommodation.  There is no nystagmus.  Facial movement/sensation: There is no facial weakness.  Sensation is intact in the first, second, and third divisions of the trigeminal nerve.  The corneal reflex is intact.  Auditory: Hearing is intact to finger rub bilaterally.  Cranial nerves IX, X, XI, XII: Phonation is normal.  No dysphagia.  Tongue is protruded in the midline without atrophy.  The gag reflex is intact.  Shoulder shrug is normal.    Musculoligamentous ligamentous examination: There is no evidence of hyperreflexia Babinski or Ludwig.  Both ankle reflexes are absent.  Her strength is diminished generally.  There is no sensory loss.  She has significant truncal ataxia.  She is unable to tandem walk.  She is under unable to stand without help with balance.  I'm unable to find focal weakness, however.    Medical Decision Making:     Diagnostic Data Set:  The MRI of the brain shows significant and marked atrophy of the cerebellum.  She has other changes throughout the cerebral parenchyma and the cerebral hemisphere.  Some of these changes are consistent with small vessel disease were his others are reminiscent of multiple sclerosis.  She does not have hydrocephalus.  The cervical MRI shows cervical spondylosis and narrowing " of the canal.      Assessment:  Multiple sclerosis; cerebellar atrophy          Recommendations:  I have recommended a full neurological evaluation with diagnostic studies including total myelography, post-myelographic CT scanning of the cervical and lumbar spine; EMG and NCV of both lower extremities and cerebral spinal fluid examination for multiple sclerosis markers.  I will also discuss his case with neurology particularly in context of the marked cerebellar atrophy.  We'll follow through this and keep you informed.  I do think it unlikely that she has a surgically correctable abnormality, however.        I greatly appreciate the opportunity to see and evaluate this individual.  If you have questions or concerns regarding issues that I may have overlooked please call me at any time: [phone].  Brien Lopez M.D.  [facility]  [address].  [place] [address]    Scribed for Anthony Lopez MD by Elizabeth Hernandez CMA. 11/17/2017  11:22 AM     I have read and concur with the information provided by the scribe.  Anthony Lopez MD

## 2017-12-06 ENCOUNTER — HOSPITAL ENCOUNTER (OUTPATIENT)
Dept: GENERAL RADIOLOGY | Facility: HOSPITAL | Age: 47
Discharge: HOME | End: 2017-12-06
Attending: NEUROLOGICAL SURGERY

## 2017-12-06 ENCOUNTER — HOSPITAL ENCOUNTER (OUTPATIENT)
Dept: GENERAL RADIOLOGY | Facility: HOSPITAL | Age: 47
Discharge: HOME | End: 2017-12-06
Attending: NEUROLOGICAL SURGERY | Admitting: NEUROLOGICAL SURGERY

## 2017-12-06 ENCOUNTER — HOSPITAL ENCOUNTER (OUTPATIENT)
Dept: CT IMAGING | Facility: HOSPITAL | Age: 47
Discharge: HOME | End: 2017-12-06
Attending: NEUROLOGICAL SURGERY

## 2017-12-06 ENCOUNTER — HOSPITAL ENCOUNTER (OUTPATIENT)
Dept: NEUROLOGY | Facility: HOSPITAL | Age: 47
Discharge: HOME | End: 2017-12-06
Attending: NEUROLOGICAL SURGERY

## 2017-12-06 VITALS
TEMPERATURE: 97.6 F | DIASTOLIC BLOOD PRESSURE: 89 MMHG | HEIGHT: 63 IN | WEIGHT: 194 LBS | BODY MASS INDEX: 34.38 KG/M2 | HEART RATE: 87 BPM | OXYGEN SATURATION: 96 % | RESPIRATION RATE: 20 BRPM | SYSTOLIC BLOOD PRESSURE: 149 MMHG

## 2017-12-06 LAB — GLUCOSE BLDC GLUCOMTR-MCNC: 171 MG/DL (ref 70–130)

## 2017-12-06 PROCEDURE — 95910 NRV CNDJ TEST 7-8 STUDIES: CPT

## 2017-12-06 PROCEDURE — 82042 OTHER SOURCE ALBUMIN QUAN EA: CPT | Performed by: NEUROLOGICAL SURGERY

## 2017-12-06 PROCEDURE — 63710000001 DIPHENHYDRAMINE PER 50 MG: Performed by: NEUROLOGICAL SURGERY

## 2017-12-06 PROCEDURE — 83916 OLIGOCLONAL BANDS: CPT | Performed by: NEUROLOGICAL SURGERY

## 2017-12-06 PROCEDURE — 95886 MUSC TEST DONE W/N TEST COMP: CPT

## 2017-12-06 PROCEDURE — 82962 GLUCOSE BLOOD TEST: CPT

## 2017-12-06 PROCEDURE — 82040 ASSAY OF SERUM ALBUMIN: CPT | Performed by: NEUROLOGICAL SURGERY

## 2017-12-06 PROCEDURE — 72270 MYELOGPHY 2/> SPINE REGIONS: CPT

## 2017-12-06 PROCEDURE — 83873 ASSAY OF CSF PROTEIN: CPT | Performed by: NEUROLOGICAL SURGERY

## 2017-12-06 PROCEDURE — 72128 CT CHEST SPINE W/O DYE: CPT

## 2017-12-06 PROCEDURE — 0 IOPAMIDOL 61 % SOLUTION: Performed by: NEUROLOGICAL SURGERY

## 2017-12-06 PROCEDURE — 72131 CT LUMBAR SPINE W/O DYE: CPT

## 2017-12-06 PROCEDURE — 72125 CT NECK SPINE W/O DYE: CPT

## 2017-12-06 RX ORDER — DIPHENHYDRAMINE HCL 50 MG
50 CAPSULE ORAL ONCE
Status: COMPLETED | OUTPATIENT
Start: 2017-12-06 | End: 2017-12-06

## 2017-12-06 RX ORDER — DIAZEPAM 5 MG/1
10 TABLET ORAL ONCE
Status: DISCONTINUED | OUTPATIENT
Start: 2017-12-06 | End: 2017-12-06

## 2017-12-06 RX ORDER — LIDOCAINE HYDROCHLORIDE 10 MG/ML
5 INJECTION, SOLUTION INFILTRATION; PERINEURAL ONCE
Status: DISCONTINUED | OUTPATIENT
Start: 2017-12-06 | End: 2017-12-06

## 2017-12-06 RX ORDER — DIAZEPAM 5 MG/1
10 TABLET ORAL ONCE
Status: DISCONTINUED | OUTPATIENT
Start: 2017-12-06 | End: 2017-12-06 | Stop reason: SDUPTHER

## 2017-12-06 RX ORDER — DIAZEPAM 5 MG/1
10 TABLET ORAL ONCE
Status: COMPLETED | OUTPATIENT
Start: 2017-12-06 | End: 2017-12-06

## 2017-12-06 RX ADMIN — DIPHENHYDRAMINE HYDROCHLORIDE 50 MG: 50 CAPSULE ORAL at 09:43

## 2017-12-06 RX ADMIN — DIAZEPAM 10 MG: 5 TABLET ORAL at 09:43

## 2017-12-06 RX ADMIN — LIDOCAINE HYDROCHLORIDE 5 ML: 10 INJECTION, SOLUTION INFILTRATION; PERINEURAL at 13:07

## 2017-12-06 RX ADMIN — IOPAMIDOL 15 ML: 612 INJECTION, SOLUTION INTRATHECAL at 13:08

## 2017-12-06 NOTE — POST-PROCEDURE NOTE
Radiology Procedure    Pre-procedure: procedure, risks discussed with patient. Patient understood and consented to procedure     Procedure Performed: total myelogram, lumbar puncture     IV Sedation and/or Anesthesia:  No    Complications: none    Preliminary Findings: pending    Specimen Removed: 8 cc clear, colorless CSF    Estimated Blood Loss:  0ml    Post-Procedure Diagnosis: pending    Post-Procedure Plan: encourage fluids, bed rest x 2 hours, ct C, T, L spine    Standard Discharge Instructions Given:yes     JONATHAN Middleton  12/06/17  12:54 PM

## 2017-12-06 NOTE — PLAN OF CARE
Problem: Patient Care Overview (Adult)  Goal: Plan of Care Review  Outcome: Ongoing (interventions implemented as appropriate)  Goal: Adult Individualization and Mutuality  Outcome: Ongoing (interventions implemented as appropriate)  Goal: Discharge Needs Assessment  Outcome: Ongoing (interventions implemented as appropriate)  Lumbar puncture    Problem: Myelogram (Adult)  Goal: Signs and Symptoms of Listed Potential Problems Will be Absent or Manageable (Myelogram)  Outcome: Ongoing (interventions implemented as appropriate)  Lumbar puncture

## 2017-12-08 LAB
ALB CSF/SERPL: 5 {RATIO} (ref 0–8)
ALBUMIN CSF-MCNC: 21 MG/DL (ref 11–48)
ALBUMIN SERPL-MCNC: 4.1 G/DL (ref 3.5–5.5)
IGG CSF-MCNC: 4 MG/DL (ref 0–8.6)
IGG SERPL-MCNC: 1881 MG/DL (ref 700–1600)
IGG SYNTH RATE SER+CSF CALC-MRATE: -8.6 MG/DAY
IGG/ALB CLEAR SER+CSF-RTO: 0.4 (ref 0–0.7)
IGG/ALB CSF: 0.19 {RATIO} (ref 0–0.25)
MBP CSF-MCNC: 1.8 NG/ML (ref 0–1.2)
OLIGOCLONAL BANDS.IT SER+CSF QL: ABNORMAL

## 2019-03-17 ENCOUNTER — APPOINTMENT (OUTPATIENT)
Dept: GENERAL RADIOLOGY | Facility: HOSPITAL | Age: 49
End: 2019-03-17

## 2019-03-17 ENCOUNTER — HOSPITAL ENCOUNTER (INPATIENT)
Facility: HOSPITAL | Age: 49
LOS: 2 days | Discharge: HOME HEALTH | End: 2019-03-19
Attending: EMERGENCY MEDICINE | Admitting: INTERNAL MEDICINE

## 2019-03-17 DIAGNOSIS — D50.9 IRON DEFICIENCY ANEMIA, UNSPECIFIED IRON DEFICIENCY ANEMIA TYPE: ICD-10-CM

## 2019-03-17 DIAGNOSIS — R65.20 SEVERE SEPSIS (HCC): ICD-10-CM

## 2019-03-17 DIAGNOSIS — A41.9 SEVERE SEPSIS (HCC): ICD-10-CM

## 2019-03-17 DIAGNOSIS — R53.81 DEBILITY: ICD-10-CM

## 2019-03-17 DIAGNOSIS — N39.0 ACUTE UTI (URINARY TRACT INFECTION): ICD-10-CM

## 2019-03-17 DIAGNOSIS — J18.9 PNEUMONIA DUE TO INFECTIOUS ORGANISM, UNSPECIFIED LATERALITY, UNSPECIFIED PART OF LUNG: Primary | ICD-10-CM

## 2019-03-17 LAB
A-A DO2: 55.9 MMHG (ref 0–300)
ALBUMIN SERPL-MCNC: 3.77 G/DL (ref 3.5–5.2)
ALBUMIN/GLOB SERPL: 0.9 G/DL
ALP SERPL-CCNC: 190 U/L (ref 39–117)
ALT SERPL W P-5'-P-CCNC: 14 U/L (ref 1–33)
ANION GAP SERPL CALCULATED.3IONS-SCNC: 15.1 MMOL/L
ANISOCYTOSIS BLD QL: NORMAL
ARTERIAL PATENCY WRIST A: POSITIVE
AST SERPL-CCNC: 20 U/L (ref 1–32)
ATMOSPHERIC PRESS: 735 MMHG
BACTERIA UR QL AUTO: ABNORMAL /HPF
BASE EXCESS BLDA CALC-SCNC: -1 MMOL/L
BASOPHILS # BLD AUTO: 0.01 10*3/MM3 (ref 0–0.2)
BASOPHILS NFR BLD AUTO: 0.1 % (ref 0–1.5)
BDY SITE: ABNORMAL
BILIRUB SERPL-MCNC: 0.5 MG/DL (ref 0.1–1.2)
BILIRUB UR QL STRIP: NEGATIVE
BODY TEMPERATURE: 98.6 C
BUN BLD-MCNC: 16 MG/DL (ref 6–20)
BUN/CREAT SERPL: 13 (ref 7–25)
CALCIUM SPEC-SCNC: 8.7 MG/DL (ref 8.6–10.5)
CHLORIDE SERPL-SCNC: 101 MMOL/L (ref 98–107)
CLARITY UR: ABNORMAL
CO2 SERPL-SCNC: 22.9 MMOL/L (ref 22–29)
COHGB MFR BLD: 2.8 % (ref 0–5)
COLOR UR: YELLOW
CREAT BLD-MCNC: 1.23 MG/DL (ref 0.57–1)
D-LACTATE SERPL-SCNC: 2.3 MMOL/L (ref 0.5–2)
D-LACTATE SERPL-SCNC: 4 MMOL/L (ref 0.5–2)
DEPRECATED RDW RBC AUTO: 46.8 FL (ref 37–54)
EOSINOPHIL # BLD AUTO: 0.37 10*3/MM3 (ref 0–0.4)
EOSINOPHIL NFR BLD AUTO: 4.8 % (ref 0.3–6.2)
ERYTHROCYTE [DISTWIDTH] IN BLOOD BY AUTOMATED COUNT: 19.8 % (ref 12.3–15.4)
FLUAV AG NPH QL: NEGATIVE
FLUBV AG NPH QL IA: NEGATIVE
GFR SERPL CREATININE-BSD FRML MDRD: 47 ML/MIN/1.73
GLOBULIN UR ELPH-MCNC: 4.2 GM/DL
GLUCOSE BLD-MCNC: 276 MG/DL (ref 65–99)
GLUCOSE BLDC GLUCOMTR-MCNC: 232 MG/DL (ref 70–130)
GLUCOSE BLDC GLUCOMTR-MCNC: 249 MG/DL (ref 70–130)
GLUCOSE UR STRIP-MCNC: ABNORMAL MG/DL
HBA1C MFR BLD: 7.7 % (ref 4.8–5.6)
HCO3 BLDA-SCNC: 23.4 MMOL/L (ref 22–26)
HCT VFR BLD AUTO: 27.2 % (ref 34–46.6)
HCT VFR BLD CALC: 23 % (ref 37–47)
HGB BLD-MCNC: 7.3 G/DL (ref 12–15.9)
HGB BLDA-MCNC: 7.9 G/DL (ref 12–16)
HGB UR QL STRIP.AUTO: ABNORMAL
HOLD SPECIMEN: NORMAL
HOROWITZ INDEX BLD+IHG-RTO: 21 %
HYALINE CASTS UR QL AUTO: ABNORMAL /LPF
HYPOCHROMIA BLD QL: NORMAL
IMM GRANULOCYTES # BLD AUTO: 0.03 10*3/MM3 (ref 0–0.05)
IMM GRANULOCYTES NFR BLD AUTO: 0.4 % (ref 0–0.5)
KETONES UR QL STRIP: NEGATIVE
L PNEUMO1 AG UR QL IA: NEGATIVE
LEUKOCYTE ESTERASE UR QL STRIP.AUTO: ABNORMAL
LYMPHOCYTES # BLD AUTO: 0.49 10*3/MM3 (ref 0.7–3.1)
LYMPHOCYTES NFR BLD AUTO: 6.4 % (ref 19.6–45.3)
MCH RBC QN AUTO: 17.7 PG (ref 26.6–33)
MCHC RBC AUTO-ENTMCNC: 26.8 G/DL (ref 31.5–35.7)
MCV RBC AUTO: 65.9 FL (ref 79–97)
METHGB BLD QL: 0.3 % (ref 0–3)
MICROCYTES BLD QL: NORMAL
MODALITY: ABNORMAL
MONOCYTES # BLD AUTO: 0.19 10*3/MM3 (ref 0.1–0.9)
MONOCYTES NFR BLD AUTO: 2.5 % (ref 5–12)
NEUTROPHILS # BLD AUTO: 6.54 10*3/MM3 (ref 1.4–7)
NEUTROPHILS NFR BLD AUTO: 85.8 % (ref 42.7–76)
NITRITE UR QL STRIP: NEGATIVE
OXYHGB MFR BLDV: 73.8 % (ref 85–100)
PCO2 BLDA: 37.2 MM HG (ref 35–45)
PH BLDA: 7.42 PH UNITS (ref 7.35–7.45)
PH UR STRIP.AUTO: <=5 [PH] (ref 5–8)
PLATELET # BLD AUTO: 535 10*3/MM3 (ref 140–450)
PMV BLD AUTO: 8 FL (ref 6–12)
PO2 BLDA: 44 MM HG (ref 80–100)
POIKILOCYTOSIS BLD QL SMEAR: NORMAL
POTASSIUM BLD-SCNC: 4.7 MMOL/L (ref 3.5–5.2)
PROT SERPL-MCNC: 8 G/DL (ref 6–8.5)
PROT UR QL STRIP: ABNORMAL
RBC # BLD AUTO: 4.13 10*6/MM3 (ref 3.77–5.28)
RBC # UR: ABNORMAL /HPF
REF LAB TEST METHOD: ABNORMAL
SAO2 % BLDCOA: 76.2 % (ref 90–100)
SMALL PLATELETS BLD QL SMEAR: NORMAL
SODIUM BLD-SCNC: 139 MMOL/L (ref 136–145)
SP GR UR STRIP: 1.02 (ref 1–1.03)
SQUAMOUS #/AREA URNS HPF: ABNORMAL /HPF
UROBILINOGEN UR QL STRIP: ABNORMAL
WBC NRBC COR # BLD: 7.63 10*3/MM3 (ref 3.4–10.8)
WBC UR QL AUTO: ABNORMAL /HPF
WHOLE BLOOD HOLD SPECIMEN: NORMAL
WHOLE BLOOD HOLD SPECIMEN: NORMAL

## 2019-03-17 PROCEDURE — 85007 BL SMEAR W/DIFF WBC COUNT: CPT | Performed by: EMERGENCY MEDICINE

## 2019-03-17 PROCEDURE — 36600 WITHDRAWAL OF ARTERIAL BLOOD: CPT | Performed by: EMERGENCY MEDICINE

## 2019-03-17 PROCEDURE — 99223 1ST HOSP IP/OBS HIGH 75: CPT | Performed by: HOSPITALIST

## 2019-03-17 PROCEDURE — 82962 GLUCOSE BLOOD TEST: CPT

## 2019-03-17 PROCEDURE — 87186 SC STD MICRODIL/AGAR DIL: CPT | Performed by: INTERNAL MEDICINE

## 2019-03-17 PROCEDURE — 87086 URINE CULTURE/COLONY COUNT: CPT | Performed by: INTERNAL MEDICINE

## 2019-03-17 PROCEDURE — 87040 BLOOD CULTURE FOR BACTERIA: CPT | Performed by: HOSPITALIST

## 2019-03-17 PROCEDURE — 87804 INFLUENZA ASSAY W/OPTIC: CPT | Performed by: EMERGENCY MEDICINE

## 2019-03-17 PROCEDURE — 63710000001 INSULIN ASPART PER 5 UNITS: Performed by: HOSPITALIST

## 2019-03-17 PROCEDURE — 94799 UNLISTED PULMONARY SVC/PX: CPT

## 2019-03-17 PROCEDURE — 25010000002 HEPARIN (PORCINE) PER 1000 UNITS: Performed by: HOSPITALIST

## 2019-03-17 PROCEDURE — 25010000002 CEFTRIAXONE: Performed by: HOSPITALIST

## 2019-03-17 PROCEDURE — 82805 BLOOD GASES W/O2 SATURATION: CPT | Performed by: EMERGENCY MEDICINE

## 2019-03-17 PROCEDURE — 83050 HGB METHEMOGLOBIN QUAN: CPT | Performed by: EMERGENCY MEDICINE

## 2019-03-17 PROCEDURE — 83036 HEMOGLOBIN GLYCOSYLATED A1C: CPT | Performed by: HOSPITALIST

## 2019-03-17 PROCEDURE — 85025 COMPLETE CBC W/AUTO DIFF WBC: CPT | Performed by: EMERGENCY MEDICINE

## 2019-03-17 PROCEDURE — 81001 URINALYSIS AUTO W/SCOPE: CPT | Performed by: INTERNAL MEDICINE

## 2019-03-17 PROCEDURE — 93005 ELECTROCARDIOGRAM TRACING: CPT | Performed by: INTERNAL MEDICINE

## 2019-03-17 PROCEDURE — 94640 AIRWAY INHALATION TREATMENT: CPT

## 2019-03-17 PROCEDURE — 82375 ASSAY CARBOXYHB QUANT: CPT | Performed by: EMERGENCY MEDICINE

## 2019-03-17 PROCEDURE — 99285 EMERGENCY DEPT VISIT HI MDM: CPT

## 2019-03-17 PROCEDURE — 71045 X-RAY EXAM CHEST 1 VIEW: CPT

## 2019-03-17 PROCEDURE — 87088 URINE BACTERIA CULTURE: CPT | Performed by: INTERNAL MEDICINE

## 2019-03-17 PROCEDURE — 25010000002 LEVOFLOXACIN PER 250 MG: Performed by: EMERGENCY MEDICINE

## 2019-03-17 PROCEDURE — 93010 ELECTROCARDIOGRAM REPORT: CPT | Performed by: INTERNAL MEDICINE

## 2019-03-17 PROCEDURE — 87899 AGENT NOS ASSAY W/OPTIC: CPT | Performed by: HOSPITALIST

## 2019-03-17 PROCEDURE — 87040 BLOOD CULTURE FOR BACTERIA: CPT | Performed by: EMERGENCY MEDICINE

## 2019-03-17 PROCEDURE — 84145 PROCALCITONIN (PCT): CPT | Performed by: HOSPITALIST

## 2019-03-17 PROCEDURE — 71045 X-RAY EXAM CHEST 1 VIEW: CPT | Performed by: RADIOLOGY

## 2019-03-17 PROCEDURE — 80053 COMPREHEN METABOLIC PANEL: CPT | Performed by: EMERGENCY MEDICINE

## 2019-03-17 PROCEDURE — 83605 ASSAY OF LACTIC ACID: CPT | Performed by: EMERGENCY MEDICINE

## 2019-03-17 PROCEDURE — 63710000001 INSULIN DETEMIR PER 5 UNITS: Performed by: PHYSICIAN ASSISTANT

## 2019-03-17 RX ORDER — IPRATROPIUM BROMIDE AND ALBUTEROL SULFATE 2.5; .5 MG/3ML; MG/3ML
3 SOLUTION RESPIRATORY (INHALATION)
Status: DISCONTINUED | OUTPATIENT
Start: 2019-03-17 | End: 2019-03-18

## 2019-03-17 RX ORDER — DEXTROSE MONOHYDRATE 25 G/50ML
25 INJECTION, SOLUTION INTRAVENOUS
Status: DISCONTINUED | OUTPATIENT
Start: 2019-03-17 | End: 2019-03-19 | Stop reason: HOSPADM

## 2019-03-17 RX ORDER — SODIUM CHLORIDE 9 MG/ML
50 INJECTION, SOLUTION INTRAVENOUS CONTINUOUS
Status: DISCONTINUED | OUTPATIENT
Start: 2019-03-17 | End: 2019-03-19

## 2019-03-17 RX ORDER — NICOTINE POLACRILEX 4 MG
15 LOZENGE BUCCAL
Status: DISCONTINUED | OUTPATIENT
Start: 2019-03-17 | End: 2019-03-19 | Stop reason: HOSPADM

## 2019-03-17 RX ORDER — HEPARIN SODIUM 5000 [USP'U]/ML
5000 INJECTION, SOLUTION INTRAVENOUS; SUBCUTANEOUS EVERY 12 HOURS SCHEDULED
Status: DISCONTINUED | OUTPATIENT
Start: 2019-03-17 | End: 2019-03-19 | Stop reason: HOSPADM

## 2019-03-17 RX ORDER — CETIRIZINE HYDROCHLORIDE 10 MG/1
10 TABLET ORAL DAILY
Status: DISCONTINUED | OUTPATIENT
Start: 2019-03-18 | End: 2019-03-19 | Stop reason: HOSPADM

## 2019-03-17 RX ORDER — DESVENLAFAXINE SUCCINATE 50 MG/1
50 TABLET, EXTENDED RELEASE ORAL DAILY
COMMUNITY

## 2019-03-17 RX ORDER — SENNA AND DOCUSATE SODIUM 50; 8.6 MG/1; MG/1
2 TABLET, FILM COATED ORAL NIGHTLY
Status: DISCONTINUED | OUTPATIENT
Start: 2019-03-17 | End: 2019-03-19 | Stop reason: HOSPADM

## 2019-03-17 RX ORDER — IBUPROFEN 400 MG/1
TABLET ORAL
Status: COMPLETED
Start: 2019-03-17 | End: 2019-03-17

## 2019-03-17 RX ORDER — LORAZEPAM 0.5 MG/1
0.5 TABLET ORAL NIGHTLY
Status: CANCELLED | OUTPATIENT
Start: 2019-03-17

## 2019-03-17 RX ORDER — SODIUM CHLORIDE 0.9 % (FLUSH) 0.9 %
3-10 SYRINGE (ML) INJECTION AS NEEDED
Status: DISCONTINUED | OUTPATIENT
Start: 2019-03-17 | End: 2019-03-19 | Stop reason: HOSPADM

## 2019-03-17 RX ORDER — ASPIRIN 81 MG/1
81 TABLET ORAL DAILY
Status: DISCONTINUED | OUTPATIENT
Start: 2019-03-18 | End: 2019-03-19 | Stop reason: HOSPADM

## 2019-03-17 RX ORDER — IBUPROFEN 400 MG/1
800 TABLET ORAL ONCE
Status: COMPLETED | OUTPATIENT
Start: 2019-03-17 | End: 2019-03-17

## 2019-03-17 RX ORDER — AMLODIPINE BESYLATE 5 MG/1
5 TABLET ORAL DAILY
Status: DISCONTINUED | OUTPATIENT
Start: 2019-03-18 | End: 2019-03-19 | Stop reason: HOSPADM

## 2019-03-17 RX ORDER — GABAPENTIN 400 MG/1
400 CAPSULE ORAL 3 TIMES DAILY
Status: DISCONTINUED | OUTPATIENT
Start: 2019-03-18 | End: 2019-03-19 | Stop reason: HOSPADM

## 2019-03-17 RX ORDER — CARVEDILOL 12.5 MG/1
18.75 TABLET ORAL 2 TIMES DAILY WITH MEALS
COMMUNITY

## 2019-03-17 RX ORDER — TIZANIDINE 4 MG/1
8 TABLET ORAL 3 TIMES DAILY PRN
Status: DISCONTINUED | OUTPATIENT
Start: 2019-03-17 | End: 2019-03-19 | Stop reason: HOSPADM

## 2019-03-17 RX ORDER — ATORVASTATIN CALCIUM 20 MG/1
20 TABLET, FILM COATED ORAL NIGHTLY
Status: DISCONTINUED | OUTPATIENT
Start: 2019-03-17 | End: 2019-03-19 | Stop reason: HOSPADM

## 2019-03-17 RX ORDER — IPRATROPIUM BROMIDE AND ALBUTEROL SULFATE 2.5; .5 MG/3ML; MG/3ML
3 SOLUTION RESPIRATORY (INHALATION) ONCE
Status: COMPLETED | OUTPATIENT
Start: 2019-03-17 | End: 2019-03-17

## 2019-03-17 RX ORDER — HYDROCODONE BITARTRATE AND ACETAMINOPHEN 10; 325 MG/1; MG/1
1 TABLET ORAL 3 TIMES DAILY
Status: CANCELLED | OUTPATIENT
Start: 2019-03-17

## 2019-03-17 RX ORDER — LEVOCETIRIZINE DIHYDROCHLORIDE 5 MG/1
5 TABLET, FILM COATED ORAL EVERY EVENING
COMMUNITY

## 2019-03-17 RX ORDER — LEVOFLOXACIN 5 MG/ML
750 INJECTION, SOLUTION INTRAVENOUS ONCE
Status: COMPLETED | OUTPATIENT
Start: 2019-03-17 | End: 2019-03-17

## 2019-03-17 RX ORDER — HYDROCODONE BITARTRATE AND ACETAMINOPHEN 10; 325 MG/1; MG/1
1 TABLET ORAL 3 TIMES DAILY
Status: DISCONTINUED | OUTPATIENT
Start: 2019-03-17 | End: 2019-03-19 | Stop reason: HOSPADM

## 2019-03-17 RX ORDER — NAPROXEN 250 MG/1
500 TABLET ORAL 2 TIMES DAILY PRN
Status: CANCELLED | OUTPATIENT
Start: 2019-03-17

## 2019-03-17 RX ORDER — SODIUM CHLORIDE 0.9 % (FLUSH) 0.9 %
3 SYRINGE (ML) INJECTION EVERY 12 HOURS SCHEDULED
Status: DISCONTINUED | OUTPATIENT
Start: 2019-03-17 | End: 2019-03-19 | Stop reason: HOSPADM

## 2019-03-17 RX ORDER — DESVENLAFAXINE SUCCINATE 50 MG/1
50 TABLET, EXTENDED RELEASE ORAL DAILY
Status: DISCONTINUED | OUTPATIENT
Start: 2019-03-18 | End: 2019-03-19 | Stop reason: HOSPADM

## 2019-03-17 RX ORDER — ACETAMINOPHEN 325 MG/1
975 TABLET ORAL ONCE
Status: COMPLETED | OUTPATIENT
Start: 2019-03-17 | End: 2019-03-17

## 2019-03-17 RX ORDER — GABAPENTIN 400 MG/1
1200 CAPSULE ORAL EVERY 8 HOURS SCHEDULED
Status: CANCELLED | OUTPATIENT
Start: 2019-03-17

## 2019-03-17 RX ORDER — CARVEDILOL 6.25 MG/1
18.75 TABLET ORAL 2 TIMES DAILY WITH MEALS
Status: DISCONTINUED | OUTPATIENT
Start: 2019-03-17 | End: 2019-03-19 | Stop reason: HOSPADM

## 2019-03-17 RX ORDER — LORAZEPAM 0.5 MG/1
0.5 TABLET ORAL NIGHTLY
Status: DISCONTINUED | OUTPATIENT
Start: 2019-03-17 | End: 2019-03-19 | Stop reason: HOSPADM

## 2019-03-17 RX ORDER — SODIUM CHLORIDE 0.9 % (FLUSH) 0.9 %
10 SYRINGE (ML) INJECTION AS NEEDED
Status: DISCONTINUED | OUTPATIENT
Start: 2019-03-17 | End: 2019-03-19 | Stop reason: HOSPADM

## 2019-03-17 RX ADMIN — DOXYCYCLINE 100 MG: 100 INJECTION, POWDER, LYOPHILIZED, FOR SOLUTION INTRAVENOUS at 19:33

## 2019-03-17 RX ADMIN — INSULIN ASPART 3 UNITS: 100 INJECTION, SOLUTION INTRAVENOUS; SUBCUTANEOUS at 21:33

## 2019-03-17 RX ADMIN — SODIUM CHLORIDE, PRESERVATIVE FREE 3 ML: 5 INJECTION INTRAVENOUS at 21:19

## 2019-03-17 RX ADMIN — ATORVASTATIN CALCIUM 20 MG: 20 TABLET, FILM COATED ORAL at 21:34

## 2019-03-17 RX ADMIN — IBUPROFEN: 400 TABLET ORAL at 13:18

## 2019-03-17 RX ADMIN — IPRATROPIUM BROMIDE AND ALBUTEROL SULFATE 3 ML: .5; 3 SOLUTION RESPIRATORY (INHALATION) at 19:02

## 2019-03-17 RX ADMIN — HYDROCODONE BITARTRATE AND ACETAMINOPHEN 1 TABLET: 10; 325 TABLET ORAL at 21:19

## 2019-03-17 RX ADMIN — IBUPROFEN 800 MG: 400 TABLET, FILM COATED ORAL at 13:08

## 2019-03-17 RX ADMIN — SODIUM CHLORIDE 100 ML/HR: 9 INJECTION, SOLUTION INTRAVENOUS at 19:33

## 2019-03-17 RX ADMIN — CEFTRIAXONE 1 G: 1 INJECTION, POWDER, FOR SOLUTION INTRAMUSCULAR; INTRAVENOUS at 21:18

## 2019-03-17 RX ADMIN — GABAPENTIN 400 MG: 400 CAPSULE ORAL at 23:16

## 2019-03-17 RX ADMIN — SODIUM CHLORIDE 500 ML: 9 INJECTION, SOLUTION INTRAVENOUS at 13:56

## 2019-03-17 RX ADMIN — HEPARIN SODIUM 5000 UNITS: 5000 INJECTION INTRAVENOUS; SUBCUTANEOUS at 21:18

## 2019-03-17 RX ADMIN — IPRATROPIUM BROMIDE AND ALBUTEROL SULFATE 3 ML: .5; 3 SOLUTION RESPIRATORY (INHALATION) at 13:15

## 2019-03-17 RX ADMIN — INSULIN DETEMIR 35 UNITS: 100 INJECTION, SOLUTION SUBCUTANEOUS at 21:33

## 2019-03-17 RX ADMIN — ACETAMINOPHEN 975 MG: 325 TABLET, FILM COATED ORAL at 13:09

## 2019-03-17 RX ADMIN — SENNOSIDES AND DOCUSATE SODIUM 2 TABLET: 8.6; 5 TABLET ORAL at 21:19

## 2019-03-17 RX ADMIN — LEVOFLOXACIN 750 MG: 5 INJECTION, SOLUTION INTRAVENOUS at 15:33

## 2019-03-17 RX ADMIN — CARVEDILOL 18.75 MG: 6.25 TABLET, FILM COATED ORAL at 21:34

## 2019-03-17 RX ADMIN — INSULIN ASPART 3 UNITS: 100 INJECTION, SOLUTION INTRAVENOUS; SUBCUTANEOUS at 17:51

## 2019-03-17 NOTE — PLAN OF CARE
Problem: Fall Risk (Adult)  Goal: Identify Related Risk Factors and Signs and Symptoms  Outcome: Ongoing (interventions implemented as appropriate)    Goal: Absence of Fall  Outcome: Ongoing (interventions implemented as appropriate)      Problem: Patient Care Overview  Goal: Plan of Care Review  Outcome: Ongoing (interventions implemented as appropriate)    Goal: Individualization and Mutuality  Outcome: Ongoing (interventions implemented as appropriate)    Goal: Discharge Needs Assessment  Outcome: Ongoing (interventions implemented as appropriate)    Goal: Interprofessional Rounds/Family Conf  Outcome: Ongoing (interventions implemented as appropriate)      Problem: Infection, Risk/Actual (Adult)  Goal: Identify Related Risk Factors and Signs and Symptoms  Outcome: Ongoing (interventions implemented as appropriate)    Goal: Infection Prevention/Resolution  Outcome: Ongoing (interventions implemented as appropriate)      Problem: Skin Injury Risk (Adult)  Goal: Identify Related Risk Factors and Signs and Symptoms  Outcome: Ongoing (interventions implemented as appropriate)    Goal: Skin Health and Integrity  Outcome: Ongoing (interventions implemented as appropriate)

## 2019-03-17 NOTE — ED NOTES
3 Jose L called for report. Clerk Kathy states the Nurse will call ER back in about 5 minutes, the Nurse was in another room with a patient.     Timothy Tipton, RN  03/17/19 1740

## 2019-03-17 NOTE — ED NOTES
Patient care handoff report called to [address] [place] [address].  Jocelyn Banks, RN received patient care report.       Timothy Tipton RN  03/17/19 1680

## 2019-03-17 NOTE — H&P
[facility]    Patient Identification:  Name:  Yissel Lopez  Age:  48 y.o.  Sex:  female  :  1970  MRN:  [id number]   Visit Number:  [id number]  Room number:  115/15  Primary Care Physician:  Gardenia Weiss APRN     Chief complaint: Fever and cough with shortness of breath  Chief Complaint   Patient presents with   • Abdominal Pain       History of presenting illness:  48 y.o. female chronic pain syndrome diabetes type 2 obesity she is also being worked up for possible multiple sclerosis but it is not confirmed yet, presented to emergency room with chief complaint of fever coughing occasional wheezing with diffuse aches.  It started 2 days ago no rhinorrhea, no sore throat coughing was described as productive with yellow sputum associated with poor intake and nausea.  She denies dysuria or polyuria.  Denies diarrhea.  Denies reports of member family with flu, denies recent travel.  At the emergency room her temperature was 103.1 pulse was 112 respiration 22 temperature is 108/72.  She also has hypoxemia with room also saturation initially was 62 it improved with nebulization and oxygen support supplementation.    She received Levaquin 750 mg IV x1, a 10 mg of ibuprofen and Tylenol 975 mg.  She also received a bolus of 500 cc normal saline.  X-ray revealed possible infiltrate right lower lung field and urinalysis shows UTI.  Repeat temperature is now 100.  Her lactic acid is elevated at 4 and also some acute kidney injury with creatinine of 1.23.  Flu a and B is negative.  Because of this she will be admitted for further treatment.    ---------------------------------------------------------------------------------------------------------------------   Review of Systems   Constitutional: Positive for chills and fever. Negative for activity change, appetite change, diaphoresis, fatigue and unexpected weight change.   HENT: Negative for congestion, dental  problem, ear pain, mouth sores, nosebleeds, postnasal drip, rhinorrhea, sinus pressure, sinus pain, sneezing, sore throat, tinnitus, trouble swallowing and voice change.    Eyes: Negative for photophobia, discharge, redness, itching and visual disturbance.   Respiratory: Positive for cough, shortness of breath and wheezing. Negative for apnea, choking, chest tightness and stridor.    Cardiovascular: Negative for chest pain, palpitations and leg swelling.   Gastrointestinal: Negative for abdominal distention, abdominal pain, anal bleeding, blood in stool, constipation, diarrhea, nausea, rectal pain and vomiting.   Endocrine: Negative for cold intolerance, heat intolerance, polydipsia, polyphagia and polyuria.   Genitourinary: Negative for decreased urine volume, difficulty urinating, dyspareunia, dysuria, enuresis, genital sores, menstrual problem, pelvic pain, vaginal bleeding and vaginal pain.   Musculoskeletal: Positive for arthralgias, back pain and myalgias. Negative for gait problem, joint swelling, neck pain and neck stiffness.   Skin: Negative for color change, pallor, rash and wound.   Neurological: Positive for weakness. Negative for dizziness, seizures, syncope, facial asymmetry, speech difficulty, light-headedness and headaches.   Hematological: Negative for adenopathy. Does not bruise/bleed easily.   Psychiatric/Behavioral: Positive for sleep disturbance. Negative for behavioral problems, confusion, decreased concentration, dysphoric mood, hallucinations and suicidal ideas. The patient is not hyperactive.         ---------------------------------------------------------------------------------------------------------------------   Past Medical History:   Diagnosis Date   • Arthritis    • Chronic headaches    • Depression    • Diabetes mellitus (CMS/HCC)    • Hypertension    • Menopause    • Multiple sclerosis (CMS/HCC)    • Stroke (CMS/HCC)     august this year-left side affected,speech slurrs when  tired.     Past Surgical History:   Procedure Laterality Date   • CHOLECYSTECTOMY     • TONSILLECTOMY       Family History   Problem Relation Age of Onset   • Stroke Mother    • Diabetes Mother    • Cancer Father         Pancreatic CA     Social History     Socioeconomic History   • Marital status:      Spouse name: Not on file   • Number of children: Not on file   • Years of education: Not on file   • Highest education level: Not on file   Tobacco Use   • Smoking status: Never Smoker   Substance and Sexual Activity   • Alcohol use: No   Social History Narrative    Patient is .      ---------------------------------------------------------------------------------------------------------------------   Allergies:  Erythromycin and Tramadol  ---------------------------------------------------------------------------------------------------------------------   Prior to Admission Medications     Prescriptions Last Dose Informant Patient Reported? Taking?    amLODIPine (NORVASC) 5 MG tablet   Yes No    aspirin EC 81 MG EC tablet   No No    Take 1 tablet by mouth Daily.    atorvastatin (LIPITOR) 20 MG tablet   Yes No    cyanocobalamin 1000 MCG/ML injection  Pharmacy Yes No    Inject 1,000 mcg into the shoulder, thigh, or buttocks 1 (One) Time Per Week. tuesdays    gabapentin (NEURONTIN) 600 MG tablet   Yes No    Take 1,200 mg by mouth 3 (Three) Times a Day.    glyBURIDE-metFORMIN (GLUCOVANCE) 5-500 MG per tablet   Yes No    Take 1 tablet by mouth 3 (Three) Times a Day.    HYDROcodone-acetaminophen (NORCO)  MG per tablet  Pharmacy Yes No    Take 1 tablet by mouth 3 (Three) Times a Day.    insulin aspart (novoLOG) 100 UNIT/ML injection   No No    Inject 10 Units under the skin 3 (Three) Times a Day With Meals. Use for the sliding scale too.    insulin aspart (novoLOG) 100 UNIT/ML injection   No No    Inject 0-7 Units under the skin 4 (Four) Times a Day Before Meals & at Bedtime.    Insulin Glargine  (BASAGLAR KWIKPEN) 100 UNIT/ML injection pen   No No    Inject 35 Units under the skin Daily.    LORazepam (ATIVAN) 0.5 MG tablet  Pharmacy Yes No    Take 0.5 mg by mouth Daily As Needed for Anxiety. 1-2 tablets daily prn    naproxen (NAPROSYN) 500 MG tablet   Yes No    Take 1,000 mg by mouth 2 (Two) Times a Day As Needed.    promethazine (PHENERGAN) 25 MG tablet   Yes No    tiZANidine (ZANAFLEX) 4 MG tablet   Yes No    Take 4 mg by mouth 3 (Three) Times a Day As Needed. Take 1 to 2 tablets by mouth 3 times a day.    Umeclidinium Bromide (INCRUSE ELLIPTA) 62.5 MCG/INH aerosol powder   Spouse/Significant Other Yes No    Inhale 1 puff Daily.        ---------------------------------------------------------------------------------------------------------------------   Vital Signs:  Temp:  [99.7 °F (37.6 °C)-103.1 °F (39.5 °C)] 100 °F (37.8 °C)  Heart Rate:  [104-112] 105  Resp:  [20-22] 22  BP: (108-150)/(61-72) 140/72    No data found.  SpO2:  [62 %-98 %] 98 %  on  Flow (L/min):  [3] 3;   Device (Oxygen Therapy): nasal cannula  Body mass index is 35.43 kg/m².    Wt Readings from Last 3 Encounters:   03/17/19 90.7 kg (200 lb)   12/06/17 88 kg (194 lb)   08/30/17 88.2 kg (194 lb 6.4 oz)               ---------------------------------------------------------------------------------------------------------------------   Physical Exam:  Constitutional: Obese, she is not in obvious respiratory distress, she is awake and alert, she has frequent cough, she looks older than stated age of 48, she looks chronically ill.      HENT:  Head: Normocephalic and atraumatic.  Mouth:  Moist mucous membranes.  No rhinorrhea, no tonsillopharyngeal congestion.  Pupils reactive to light no nystagmus  Eyes:  Conjunctivae and EOM are normal.  Pupils are equal, round, and reactive to light.  No scleral icterus.  Neck:  Neck supple.  No JVD present.  No bruit no lymphadenopathy  Cardiovascular:  Normal rate, regular rhythm and normal heart sounds  with no murmur.  Pulmonary/Chest:  No respiratory distress, wheezing mostly post tussive, scattered rhonchi, crackles and rales noted on right lower lung fields mostly posteriorly along the posterior axillary line inspiratory, equal expansion no splinting.  No change in vocal and tactile fremitus.    Abdominal:  Soft.  Bowel sounds are normal.  No distension and no tenderness.  Obese, no guarding no rigidity, large right upper quadrant scar from previous cholecystectomy.  Musculoskeletal:  No edema, no tenderness, and no deformity.  No red or swollen joints anywhere.    Neurological:  Alert and oriented to person, place, and time.  No cranial nerve deficit.  No tongue deviation.  No facial droop.  No slurred speech.  She is somewhat hyperreflexic  Skin:  Skin is warm and dry.  No rash noted.  No pallor.   Peripheral vascular:  No edema and strong pulses on all 4 extremities.    ---------------------------------------------------------------------------------------------------------------------  EKG: EKG is pending  Telemetry: Sinus rhythm rate of 97 bpm  I have personally looked at both the EKG and the telemetry strips.  --------------------------------------------------------------------------------------------------------------------    Results from last 7 days   Lab Units 03/17/19  1257   LACTATE mmol/L 4.0*   WBC 10*3/mm3 7.63   HEMOGLOBIN g/dL 7.3*   HEMATOCRIT % 27.2*   MCV fL 65.9*   MCHC g/dL 26.8*   PLATELETS 10*3/mm3 535*     Results from last 7 days   Lab Units 03/17/19  1257   SODIUM mmol/L 139   POTASSIUM mmol/L 4.7   CHLORIDE mmol/L 101   CO2 mmol/L 22.9   BUN mg/dL 16   CREATININE mg/dL 1.23*   EGFR IF NONAFRICN AM mL/min/1.73 47*   CALCIUM mg/dL 8.7   GLUCOSE mg/dL 276*   ALBUMIN g/dL 3.77   BILIRUBIN mg/dL 0.5   ALK PHOS U/L 190*   AST (SGOT) U/L 20   ALT (SGPT) U/L 14   Estimated Creatinine Clearance: 60.4 mL/min (A) (by C-G formula based on SCr of 1.23 mg/dL (H)).    No results found for: HGBA1C,  POCGLU  No results found for: AMMONIA    Results from last 7 days   Lab Units 03/17/19  1515   NITRITE UA  Negative   WBC UA /HPF Too Numerous to Count*   BACTERIA UA /HPF 4+*   SQUAM EPITHEL UA /HPF 3-6*             pH pH, Arterial   Date Value Ref Range Status   03/17/2019 7.416 7.350 - 7.450 pH units Final      pO2 pO2, Arterial   Date Value Ref Range Status   03/17/2019 44.0 (C) 80.0 - 100.0 mm Hg Final      pCO2 pCO2, Arterial   Date Value Ref Range Status   03/17/2019 37.2 35.0 - 45.0 mm Hg Final      HCO3 HCO3, Arterial   Date Value Ref Range Status   03/17/2019 23.4 22.0 - 26.0 mmol/L Final        I have personally looked at the labs and they are summarized above.  ----------------------------------------------------------------------------------------------------------------------  Imaging Results (last 24 hours)     Procedure Component Value Units Date/Time    XR Chest AP [[id number]] Updated:  03/17/19 2655        I have personally reviewed the radiology images and read the final radiology report.  ----------------------------------------------------------------------------------------------------------------------  Assessment and Plan:  -Suspect secondary to UTI and probable right lower lobe pneumonia  -Acute hypoxic respiratory failure  -UTI  -Acute kidney injury  -Lactic acidosis  -Dyslipidemia  -History of right middle cerebral artery stroke  -Neuromuscular disorder being work-up for possible multiple sclerosis, my review of labs and work-up for MS seems to be negative, she is to follow with Dr. Anthony Lopez/neurology in [facility] with spinal stenosis of C5-C6  -History of hypertension  -Microcytic anemia  -Diabetes type 2 insulin requiring with hyperglycemia  -Chronic pain syndrome  -Morbid obesity  -Essential hypertension  -Query obstructive sleep apnea    Cultures has been done, antibiotic has been started, DVT and GI prophylaxis, Accu-Chek and sliding scale, hydration, DuoNeb as needed,  oxygen supplementation, fall precautions, gentle hydration.  Monitor electrolytes and renal function.  Hold off NSAIDs for now. Patient will need outpatient sleep study to rule out sleep apnea once stable and discharge.    Plan has been outlined to the patient together with her  and agreed further management may change as condition evolves.    Patient is high risk coming in with sepsis acute hypoxic respiratory failure and acute kidney injury, she has UTI and pneumonia she will require more than 2 nights of stay.    Amy Ross MD  03/17/19  3:40 PM

## 2019-03-17 NOTE — ED PROVIDER NOTES
Subjective   Patient presents to ER with fever, cough and shortness of breath.        Shortness of Breath   Severity:  Moderate  Onset quality:  Gradual  Duration:  2 days  Progression:  Worsening  Chronicity:  New  Context: activity    Relieved by:  Nothing  Worsened by:  Exertion and movement  Ineffective treatments:  None tried  Associated symptoms: fever        Review of Systems   Constitutional: Positive for activity change, fatigue and fever.   HENT: Negative.    Eyes: Negative.    Respiratory: Positive for shortness of breath.    Cardiovascular: Negative.    Gastrointestinal: Negative.    Endocrine: Negative.    Genitourinary: Negative.    Musculoskeletal: Negative.    Allergic/Immunologic: Negative.    Neurological: Negative.    Psychiatric/Behavioral: Negative.        Past Medical History:   Diagnosis Date   • Arthritis    • Chronic headaches    • Depression    • Diabetes mellitus (CMS/HCC)    • Hypertension    • Menopause    • Multiple sclerosis (CMS/HCC)    • Stroke (CMS/HCC)     august this year-left side affected,speech slurrs when tired.       Allergies   Allergen Reactions   • Erythromycin    • Tramadol        Past Surgical History:   Procedure Laterality Date   • CHOLECYSTECTOMY     • TONSILLECTOMY         Family History   Problem Relation Age of Onset   • Stroke Mother    • Diabetes Mother    • Cancer Father         Pancreatic CA       Social History     Socioeconomic History   • Marital status:      Spouse name: Not on file   • Number of children: Not on file   • Years of education: Not on file   • Highest education level: Not on file   Tobacco Use   • Smoking status: Never Smoker   Substance and Sexual Activity   • Alcohol use: No   Social History Narrative    Patient is .            Objective   Physical Exam   Constitutional: She is oriented to person, place, and time. She appears ill.   HENT:   Head: Normocephalic and atraumatic.   Eyes: Pupils are equal, round, and reactive to  light.   Cardiovascular: Normal rate.   Pulmonary/Chest: She is in respiratory distress. She has rhonchi. She has rales.   Abdominal: Normal appearance and bowel sounds are normal.   Neurological: She is alert and oriented to person, place, and time.   Skin: Skin is warm.   Nursing note and vitals reviewed.      Procedures           ED Course  ED Course as of Mar 21 2147   Sun Mar 17, 2019   1449 CXR early basilar  [SHELLEY]      ED Course User Index  [SHELLEY] Gadiel Villagomez MD                  [facility]      Final diagnoses:   Pneumonia due to infectious organism, unspecified laterality, unspecified part of lung            Gadiel Villagomez MD  03/17/19 1445       Gadiel Villagomez MD  03/21/19 2147

## 2019-03-18 ENCOUNTER — APPOINTMENT (OUTPATIENT)
Dept: GENERAL RADIOLOGY | Facility: HOSPITAL | Age: 49
End: 2019-03-18

## 2019-03-18 PROBLEM — R65.20 SEVERE SEPSIS (HCC): Status: ACTIVE | Noted: 2019-03-18

## 2019-03-18 PROBLEM — A41.9 SEVERE SEPSIS (HCC): Status: ACTIVE | Noted: 2019-03-18

## 2019-03-18 PROBLEM — N39.0 ACUTE UTI (URINARY TRACT INFECTION): Status: ACTIVE | Noted: 2019-03-18

## 2019-03-18 LAB
ANION GAP SERPL CALCULATED.3IONS-SCNC: 13 MMOL/L
ANISOCYTOSIS BLD QL: NORMAL
BASOPHILS # BLD AUTO: 0.02 10*3/MM3 (ref 0–0.2)
BASOPHILS NFR BLD AUTO: 0.1 % (ref 0–1.5)
BUN BLD-MCNC: 14 MG/DL (ref 6–20)
BUN/CREAT SERPL: 12.3 (ref 7–25)
CALCIUM SPEC-SCNC: 8.7 MG/DL (ref 8.6–10.5)
CHLORIDE SERPL-SCNC: 103 MMOL/L (ref 98–107)
CO2 SERPL-SCNC: 23 MMOL/L (ref 22–29)
CREAT BLD-MCNC: 1.14 MG/DL (ref 0.57–1)
CRP SERPL-MCNC: 21.15 MG/DL (ref 0–0.5)
D-LACTATE SERPL-SCNC: 1.1 MMOL/L (ref 0.5–2)
D-LACTATE SERPL-SCNC: 1.2 MMOL/L (ref 0.5–2)
D-LACTATE SERPL-SCNC: 1.3 MMOL/L (ref 0.5–2)
DEPRECATED RDW RBC AUTO: 47.5 FL (ref 37–54)
EOSINOPHIL # BLD AUTO: 0.1 10*3/MM3 (ref 0–0.4)
EOSINOPHIL NFR BLD AUTO: 0.7 % (ref 0.3–6.2)
ERYTHROCYTE [DISTWIDTH] IN BLOOD BY AUTOMATED COUNT: 19.9 % (ref 12.3–15.4)
GFR SERPL CREATININE-BSD FRML MDRD: 51 ML/MIN/1.73
GLUCOSE BLD-MCNC: 225 MG/DL (ref 65–99)
GLUCOSE BLDC GLUCOMTR-MCNC: 178 MG/DL (ref 70–130)
GLUCOSE BLDC GLUCOMTR-MCNC: 207 MG/DL (ref 70–130)
GLUCOSE BLDC GLUCOMTR-MCNC: 253 MG/DL (ref 70–130)
GLUCOSE BLDC GLUCOMTR-MCNC: 254 MG/DL (ref 70–130)
HCT VFR BLD AUTO: 27.1 % (ref 34–46.6)
HGB BLD-MCNC: 7.3 G/DL (ref 12–15.9)
HYPOCHROMIA BLD QL: NORMAL
IMM GRANULOCYTES # BLD AUTO: 0.04 10*3/MM3 (ref 0–0.05)
IMM GRANULOCYTES NFR BLD AUTO: 0.3 % (ref 0–0.5)
LYMPHOCYTES # BLD AUTO: 1.51 10*3/MM3 (ref 0.7–3.1)
LYMPHOCYTES NFR BLD AUTO: 11.2 % (ref 19.6–45.3)
M PNEUMO IGM SER QL: NEGATIVE
MAGNESIUM SERPL-MCNC: 1.9 MG/DL (ref 1.6–2.6)
MCH RBC QN AUTO: 17.8 PG (ref 26.6–33)
MCHC RBC AUTO-ENTMCNC: 26.9 G/DL (ref 31.5–35.7)
MCV RBC AUTO: 66.1 FL (ref 79–97)
MICROCYTES BLD QL: NORMAL
MONOCYTES # BLD AUTO: 1.7 10*3/MM3 (ref 0.1–0.9)
MONOCYTES NFR BLD AUTO: 12.6 % (ref 5–12)
NEUTROPHILS # BLD AUTO: 10.17 10*3/MM3 (ref 1.4–7)
NEUTROPHILS NFR BLD AUTO: 75.1 % (ref 42.7–76)
PHOSPHATE SERPL-MCNC: 2.1 MG/DL (ref 2.5–4.5)
PLATELET # BLD AUTO: 541 10*3/MM3 (ref 140–450)
PMV BLD AUTO: 8.2 FL (ref 6–12)
POTASSIUM BLD-SCNC: 4.2 MMOL/L (ref 3.5–5.2)
PROCALCITONIN SERPL-MCNC: 9.17 NG/ML (ref 0.1–0.25)
RBC # BLD AUTO: 4.1 10*6/MM3 (ref 3.77–5.28)
SMALL PLATELETS BLD QL SMEAR: NORMAL
SODIUM BLD-SCNC: 139 MMOL/L (ref 136–145)
WBC NRBC COR # BLD: 13.54 10*3/MM3 (ref 3.4–10.8)

## 2019-03-18 PROCEDURE — 25010000002 CEFTRIAXONE: Performed by: HOSPITALIST

## 2019-03-18 PROCEDURE — 85007 BL SMEAR W/DIFF WBC COUNT: CPT | Performed by: HOSPITALIST

## 2019-03-18 PROCEDURE — 85025 COMPLETE CBC W/AUTO DIFF WBC: CPT | Performed by: HOSPITALIST

## 2019-03-18 PROCEDURE — 82962 GLUCOSE BLOOD TEST: CPT

## 2019-03-18 PROCEDURE — 71046 X-RAY EXAM CHEST 2 VIEWS: CPT

## 2019-03-18 PROCEDURE — 71046 X-RAY EXAM CHEST 2 VIEWS: CPT | Performed by: RADIOLOGY

## 2019-03-18 PROCEDURE — 86140 C-REACTIVE PROTEIN: CPT | Performed by: HOSPITALIST

## 2019-03-18 PROCEDURE — 94799 UNLISTED PULMONARY SVC/PX: CPT

## 2019-03-18 PROCEDURE — 63710000001 INSULIN DETEMIR PER 5 UNITS: Performed by: PHYSICIAN ASSISTANT

## 2019-03-18 PROCEDURE — 99233 SBSQ HOSP IP/OBS HIGH 50: CPT | Performed by: PHYSICIAN ASSISTANT

## 2019-03-18 PROCEDURE — G0108 DIAB MANAGE TRN  PER INDIV: HCPCS

## 2019-03-18 PROCEDURE — 25010000002 HEPARIN (PORCINE) PER 1000 UNITS: Performed by: HOSPITALIST

## 2019-03-18 PROCEDURE — 84100 ASSAY OF PHOSPHORUS: CPT | Performed by: HOSPITALIST

## 2019-03-18 PROCEDURE — 83735 ASSAY OF MAGNESIUM: CPT | Performed by: HOSPITALIST

## 2019-03-18 PROCEDURE — 86738 MYCOPLASMA ANTIBODY: CPT | Performed by: HOSPITALIST

## 2019-03-18 PROCEDURE — 83605 ASSAY OF LACTIC ACID: CPT | Performed by: HOSPITALIST

## 2019-03-18 PROCEDURE — 80048 BASIC METABOLIC PNL TOTAL CA: CPT | Performed by: HOSPITALIST

## 2019-03-18 PROCEDURE — 63710000001 INSULIN ASPART PER 5 UNITS: Performed by: HOSPITALIST

## 2019-03-18 RX ORDER — IPRATROPIUM BROMIDE AND ALBUTEROL SULFATE 2.5; .5 MG/3ML; MG/3ML
3 SOLUTION RESPIRATORY (INHALATION) EVERY 6 HOURS PRN
Status: DISCONTINUED | OUTPATIENT
Start: 2019-03-18 | End: 2019-03-19 | Stop reason: HOSPADM

## 2019-03-18 RX ORDER — L.ACID,PARA/B.BIFIDUM/S.THERM 8B CELL
1 CAPSULE ORAL DAILY
Status: DISCONTINUED | OUTPATIENT
Start: 2019-03-18 | End: 2019-03-19 | Stop reason: HOSPADM

## 2019-03-18 RX ADMIN — INSULIN ASPART 3 UNITS: 100 INJECTION, SOLUTION INTRAVENOUS; SUBCUTANEOUS at 17:04

## 2019-03-18 RX ADMIN — DESVENLAFAXINE SUCCINATE 50 MG: 50 TABLET, EXTENDED RELEASE ORAL at 08:44

## 2019-03-18 RX ADMIN — ATORVASTATIN CALCIUM 20 MG: 20 TABLET, FILM COATED ORAL at 21:19

## 2019-03-18 RX ADMIN — HYDROCODONE BITARTRATE AND ACETAMINOPHEN 1 TABLET: 10; 325 TABLET ORAL at 08:44

## 2019-03-18 RX ADMIN — DOXYCYCLINE 100 MG: 100 INJECTION, POWDER, LYOPHILIZED, FOR SOLUTION INTRAVENOUS at 05:02

## 2019-03-18 RX ADMIN — CEFTRIAXONE 1 G: 1 INJECTION, POWDER, FOR SOLUTION INTRAMUSCULAR; INTRAVENOUS at 17:04

## 2019-03-18 RX ADMIN — CARVEDILOL 18.75 MG: 6.25 TABLET, FILM COATED ORAL at 17:04

## 2019-03-18 RX ADMIN — CETIRIZINE HCL 10 MG: 10 TABLET ORAL at 08:44

## 2019-03-18 RX ADMIN — GABAPENTIN 400 MG: 400 CAPSULE ORAL at 08:44

## 2019-03-18 RX ADMIN — TIZANIDINE 8 MG: 4 TABLET ORAL at 11:31

## 2019-03-18 RX ADMIN — AMLODIPINE BESYLATE 5 MG: 5 TABLET ORAL at 08:44

## 2019-03-18 RX ADMIN — INSULIN ASPART 2 UNITS: 100 INJECTION, SOLUTION INTRAVENOUS; SUBCUTANEOUS at 08:45

## 2019-03-18 RX ADMIN — ASPIRIN 81 MG: 81 TABLET ORAL at 08:45

## 2019-03-18 RX ADMIN — Medication 1 CAPSULE: at 11:31

## 2019-03-18 RX ADMIN — DOXYCYCLINE 100 MG: 100 INJECTION, POWDER, LYOPHILIZED, FOR SOLUTION INTRAVENOUS at 17:04

## 2019-03-18 RX ADMIN — INSULIN ASPART 4 UNITS: 100 INJECTION, SOLUTION INTRAVENOUS; SUBCUTANEOUS at 21:20

## 2019-03-18 RX ADMIN — GABAPENTIN 400 MG: 400 CAPSULE ORAL at 21:19

## 2019-03-18 RX ADMIN — INSULIN DETEMIR 35 UNITS: 100 INJECTION, SOLUTION SUBCUTANEOUS at 11:32

## 2019-03-18 RX ADMIN — SODIUM CHLORIDE 100 ML/HR: 9 INJECTION, SOLUTION INTRAVENOUS at 11:32

## 2019-03-18 RX ADMIN — HYDROCODONE BITARTRATE AND ACETAMINOPHEN 1 TABLET: 10; 325 TABLET ORAL at 16:08

## 2019-03-18 RX ADMIN — HEPARIN SODIUM 5000 UNITS: 5000 INJECTION INTRAVENOUS; SUBCUTANEOUS at 08:44

## 2019-03-18 RX ADMIN — HYDROCODONE BITARTRATE AND ACETAMINOPHEN 1 TABLET: 10; 325 TABLET ORAL at 21:19

## 2019-03-18 RX ADMIN — IPRATROPIUM BROMIDE AND ALBUTEROL SULFATE 3 ML: .5; 3 SOLUTION RESPIRATORY (INHALATION) at 00:33

## 2019-03-18 RX ADMIN — GABAPENTIN 400 MG: 400 CAPSULE ORAL at 16:08

## 2019-03-18 RX ADMIN — CARVEDILOL 18.75 MG: 6.25 TABLET, FILM COATED ORAL at 08:44

## 2019-03-18 RX ADMIN — INSULIN ASPART 4 UNITS: 100 INJECTION, SOLUTION INTRAVENOUS; SUBCUTANEOUS at 11:32

## 2019-03-18 RX ADMIN — LORAZEPAM 0.5 MG: 0.5 TABLET ORAL at 21:19

## 2019-03-18 RX ADMIN — TIZANIDINE 8 MG: 4 TABLET ORAL at 21:27

## 2019-03-18 RX ADMIN — SODIUM CHLORIDE, PRESERVATIVE FREE 3 ML: 5 INJECTION INTRAVENOUS at 22:56

## 2019-03-18 RX ADMIN — HEPARIN SODIUM 5000 UNITS: 5000 INJECTION INTRAVENOUS; SUBCUTANEOUS at 21:20

## 2019-03-18 NOTE — PROGRESS NOTES
Discharge Planning Assessment   [place]     Patient Name: Yissel Lopez  MRN: [id number]  Today's Date: 3/18/2019    Admit Date: 3/17/2019    Discharge Needs Assessment     Row Name 03/18/19 1101       Living Environment    Lives With  spouse  (Pended)     Name(s) of Who Lives With Patient  Pt lives with her , Hunter.  (Pended)     Current Living Arrangements  home/apartment/condo  (Pended)     Primary Care Provided by  spouse/significant other  (Pended)     Family Caregiver if Needed  spouse  (Pended)     Quality of Family Relationships  involved;helpful  (Pended)     Able to Return to Prior Arrangements  yes  (Pended)        Transition Planning    Patient/Family Anticipates Transition to  home  (Pended)     Transportation Anticipated  family or friend will provide  (Pended)        Discharge Needs Assessment    Equipment Currently Used at Home  wheelchair  (Pended)     Equipment Needed After Discharge  none  (Pended)         Discharge Plan     Row Name 03/18/19 1101       Plan    Plan  Pt lives at home with her , Hunter. Pt does not receive  services. Pt has a wheelchair, and needs no new DME. Pt does not have a POA or living will. Pt uses [facility], and her PCP is Gardenia Weiss. Pt plans to return home at discharge. with transportation provided by her . SS will follow.   (Pended)     Patient/Family in Agreement with Plan  yes  (Pended)             Demographic Summary     Row Name 03/18/19 1105       General Information    Admission Type  inpatient  (Pended)     Referral Source  nursing  (Pended)     Reason for Consult  discharge planning  (Pended)     Preferred Language  English  (Pended)      Used During This Interaction  no  (Pended)             Lucy Hurtado

## 2019-03-18 NOTE — CONSULTS
Patient resting comfortably in bed, alert and oriented with  at bedside.  Made patient aware of current HgB A1C of 7.7.  Patient reports she has been on antibiotics and steroids several times over the past few months.  Patient reports she was diagnosed as a Gestational Diabetic 21 years ago and has been on insulin since August 2017 following a stroke. Patient reports she suffers from neuropathy in her feet.  Counseled patient on diabetes basic handout which discusses exactly what diabetes is and how it affects the body.  Counseled patient on complications of hyperglycemia and ways to prevent it.  Counseled patient on hypoglycemia and treatment by using the rule of 15.  Counseled patient on the importance of checking blood glucose levels frequently and keeping a log to take to all MD appointments.  Counseled patient on how to care for themselves during sick day.  Stressed the importance of healthy eating with a limit of carbohydrates to 180 per day.  Counseled on importance of complying with medications as ordered by provider.  Counseled patient to seek medical attention if blood glucose above 300.  Patient verbalizes understanding of all information given. Encouraged patient to attend an outpatient diabetes smart class or attend diabetes support  group.  Left time and dates of classes with patient along with my name and contact information, will continue to monitor patient as needed.

## 2019-03-18 NOTE — PROGRESS NOTES
Patient Identification:  Name:  Yissel Lopez  Age:  48 y.o.  Sex:  female  :  1970  MRN:  [id number]  Visit Number:  [id number]  Primary Care Provider:  Gardenia Weiss APRN    Length of stay:  1    Subjective     Chief complaint: Follow-up pneumonia and UTI    HPI:   48-year-old  female with past medical history significant for chronic pain syndrome, diabetes mellitus type 2, obesity and in the process of being worked up for possible multiple sclerosis.  She presented to the emergency room on 2019 with reports of fever and cough in addition to wheezing and body aches.  She was found to have initial temperature of 103.1 °F, pulse 112, respiration rate of 22 and a pressure of 108/72.  Urinalysis revealed concern for urinary tract infection in addition there was some concern regarding possible pneumonia.    Subjective:      Mrs. Lopez reports she feels tired but is feeling better than yesterday.  She reports some cough but denies dyspnea.  She denies chest pain.  We discuss her anemia.  Her  is at bedside.  They deny known history or prior endoscopy studies.      Discussed with AM CHRIS Guerrero with no acute events overnight.   ----------------------------------------------------------------------------------------------------------------------  Current Hospital Meds:    amLODIPine 5 mg Oral Daily   aspirin 81 mg Oral Daily   atorvastatin 20 mg Oral Nightly   carvedilol 18.75 mg Oral BID With Meals   ceftriaxone 1 g Intravenous Q24H   cetirizine 10 mg Oral Daily   desvenlafaxine 50 mg Oral Daily   doxycycline 100 mg Intravenous Q12H   gabapentin 400 mg Oral TID   heparin (porcine) 5,000 Units Subcutaneous Q12H   HYDROcodone-acetaminophen 1 tablet Oral TID   insulin aspart 0-7 Units Subcutaneous 4x Daily AC & at Bedtime   insulin aspart 0-7 Units Subcutaneous Once   insulin detemir 35 Units Subcutaneous Daily   lactobacillus acidophilus 1 capsule Oral Daily   LORazepam 0.5 mg Oral Nightly    sennosides-docusate sodium 2 tablet Oral Nightly   sodium chloride 3 mL Intravenous Q12H       sodium chloride 75 mL/hr Last Rate: 100 mL/hr (03/18/19 1132)     ----------------------------------------------------------------------------------------------------------------------      Objective     Vital Signs:  Temp:  [97.8 °F (36.6 °C)-100 °F (37.8 °C)] 97.8 °F (36.6 °C)  Heart Rate:  [] 82  Resp:  [18-22] 18  BP: (137-170)/(61-85) 161/70      03/17/19  1252 03/17/19  1657 03/18/19  0427   Weight: 90.7 kg (200 lb) 109 kg (241 lb 1.6 oz) 108 kg (237 lb 3.2 oz)     Body mass index is 42.02 kg/m².    Intake/Output Summary (Last 24 hours) at 3/18/2019 1444  Last data filed at 3/18/2019 0502  Gross per 24 hour   Intake 100 ml   Output 2350 ml   Net -2250 ml     No intake/output data recorded.  Diet Regular; Consistent Carbohydrate  ----------------------------------------------------------------------------------------------------------------------  Physical exam:  Constitutional:  Well-developed and well-nourished.  No respiratory distress.      HENT:  Head:  Normocephalic and atraumatic.  Mouth:  Moist mucous membranes.    Eyes:  Conjunctivae and EOM are normal.  Pupils are equal, round, and reactive to light.  No scleral icterus.    Neck:  Neck supple.  No JVD present.    Cardiovascular:  Regular rate, regular rhythm and normal heart sounds with no murmur.  Pulmonary/Chest:  No respiratory distress, no wheezes, no crackles, with normal breath sounds and good air movement.  Abdominal:  Soft.  Bowel sounds are normal.  No distension and no tenderness.   Musculoskeletal:  Mild non-pitting edema. No tenderness, and no deformity.  No red or swollen joints appreciated during examination.    Neurological:  Alert and oriented to person, place, and time.  No cranial nerve deficit.  No tongue deviation.  No facial droop.  No slurred speech.   Skin:  Skin is warm and dry. No rash noted. No pallor.    ----------------------------------------------------------------------------------------------------------------------  Tele:  SR in the 70s  ----------------------------------------------------------------------------------------------------------------------      Results from last 7 days   Lab Units 03/18/19  1136 03/18/19  0539 03/18/19  0028  03/17/19  1257   CRP mg/dL  --   --  21.15*  --   --    LACTATE mmol/L 1.3 1.2 1.1   < > 4.0*   WBC 10*3/mm3  --   --  13.54*  --  7.63   HEMOGLOBIN g/dL  --   --  7.3*  --  7.3*   HEMATOCRIT %  --   --  27.1*  --  27.2*   MCV fL  --   --  66.1*  --  65.9*   MCHC g/dL  --   --  26.9*  --  26.8*   PLATELETS 10*3/mm3  --   --  541*  --  535*    < > = values in this interval not displayed.     Results from last 7 days   Lab Units 03/17/19  1304   PH, ARTERIAL pH units 7.416   PO2 ART mm Hg 44.0*   PCO2, ARTERIAL mm Hg 37.2   HCO3 ART mmol/L 23.4     Results from last 7 days   Lab Units 03/18/19  0028 03/17/19  1257   SODIUM mmol/L 139 139   POTASSIUM mmol/L 4.2 4.7   MAGNESIUM mg/dL 1.9  --    CHLORIDE mmol/L 103 101   CO2 mmol/L 23.0 22.9   BUN mg/dL 14 16   CREATININE mg/dL 1.14* 1.23*   EGFR IF NONAFRICN AM mL/min/1.73 51* 47*   CALCIUM mg/dL 8.7 8.7   GLUCOSE mg/dL 225* 276*   ALBUMIN g/dL  --  3.77   BILIRUBIN mg/dL  --  0.5   ALK PHOS U/L  --  190*   AST (SGOT) U/L  --  20   ALT (SGPT) U/L  --  14   Estimated Creatinine Clearance: 71.8 mL/min (A) (by C-G formula based on SCr of 1.14 mg/dL (H)).  No results found for: AMMONIA      Blood Culture   Date Value Ref Range Status   03/17/2019 No growth at less than 24 hours  Preliminary   03/17/2019 No growth at less than 24 hours  Preliminary   03/17/2019 No growth at 24 hours  Preliminary   03/17/2019 No growth at 24 hours  Preliminary     Urine Culture   Date Value Ref Range Status   03/17/2019 >100,000 CFU/mL Gram Negative Bacilli (A)  Preliminary            ----------------------------------------------------------------------------------------------------------------------  Imaging Results (last 24 hours)     Procedure Component Value Units Date/Time    XR Chest AP [[id number]] Collected:  03/17/19 1959     Updated:  03/17/19 2002    Narrative:       XR CHEST AP-     CLINICAL INDICATION: cough; J18.9-Pneumonia, unspecified organism          COMPARISON: 8/25/2017      TECHNIQUE: Single frontal view of the chest.     FINDINGS:     There is no focal alveolar infiltrate or effusion.  The cardiac silhouette is normal. The pulmonary vasculature is  unremarkable.  There is no evidence of an acute osseous abnormality.   There are no suspicious-appearing parenchymal soft tissue nodules.  Recommend follow-up PA and lateral view of the chest       Impression:       No evidence of active or acute cardiopulmonary disease on today's chest  radiograph.         This report was finalized on 3/17/2019 8:00 PM by Dr. Loy Lerner MD.           ----------------------------------------------------------------------------------------------------------------------      Assessment/Plan     Assessment and Plan:  · Severe Sepsis, present on admission, likely 2/2 UTI with culture growing gram negative bacilli: Blood cultures with no growth to date. Urine culture growing gram negative bacilli at present. CXR without acute infiltrate.  Questionable pneumonia on admission.  Continue IV Rocephin.2-view CXR ordered. Will dc doxycycline as atypical testing is negative.  Will monitor daily CBC and C-RP. WBC did increase some on this date.  Q6 hour lactic acid discontinued. She did receive one dose of IV Levaquin in the ED yesterday.      Hyperglycemia with diabetes mellitus type 2: Given diabetes mellitus, fingerstick blood glucose monitoring has been continued AC&HS. Sliding scale insulin has been ordered.  Hemoglobin a1c 7.7%. Continue Levemir 35u daily per home dosing.     · Acute kidney injury:   Creatinine improving.  Continue NS at decreased rate of 75 cc/hr.     · Microcytic anemia: Will add iron studies to labs given known history of significant iron deficiency.  Denies prior endoscopy studies. Hgb stable.       · Chronic pain: Norco continued.  I have personally reviewed the [facility] report for this patient.    · Morbid obesity with BMI of 42.02 kg/m²    · History of right MCA CVA: Wheelchair bound at baseline with assistance of  for daily activities of living.       · History of possible MS with cerebellar atrophy: Dr. Lopez notes from 11/2017 reviewed.       -----------  -DVT prophylaxis: SQ Heparin  -GI prophylaxis: Pepcid added.   -Activity: AD juan, wheelchair bound at baseline  -Disposition: Remains hospitalized for further IV abx as cultures finalize.  -Diet: Consistent carb    The patient is high risk due to the following diagnoses/reasons:  Sepsis on admission with UTI, acute kidney injury, chronic pain medication    Lana Lamar PA-C  03/18/19  2:44 PM  Pager # [phone]

## 2019-03-19 VITALS
OXYGEN SATURATION: 93 % | SYSTOLIC BLOOD PRESSURE: 140 MMHG | HEIGHT: 63 IN | RESPIRATION RATE: 18 BRPM | TEMPERATURE: 98 F | WEIGHT: 232.8 LBS | DIASTOLIC BLOOD PRESSURE: 95 MMHG | BODY MASS INDEX: 41.25 KG/M2 | HEART RATE: 93 BPM

## 2019-03-19 LAB
ABO + RH BLD: NORMAL
ABO GROUP BLD: NORMAL
ABO GROUP BLD: NORMAL
ALBUMIN SERPL-MCNC: 2.91 G/DL (ref 3.5–5.2)
ALBUMIN/GLOB SERPL: 0.7 G/DL
ALP SERPL-CCNC: 99 U/L (ref 39–117)
ALT SERPL W P-5'-P-CCNC: 11 U/L (ref 1–33)
ANION GAP SERPL CALCULATED.3IONS-SCNC: 12 MMOL/L
ANISOCYTOSIS BLD QL: NORMAL
AST SERPL-CCNC: 16 U/L (ref 1–32)
BACTERIA SPEC AEROBE CULT: ABNORMAL
BASOPHILS # BLD AUTO: 0.02 10*3/MM3 (ref 0–0.2)
BASOPHILS NFR BLD AUTO: 0.2 % (ref 0–1.5)
BH BB BLOOD EXPIRATION DATE: NORMAL
BH BB BLOOD TYPE BARCODE: 5100
BH BB DISPENSE STATUS: NORMAL
BH BB PRODUCT CODE: NORMAL
BH BB UNIT NUMBER: NORMAL
BILIRUB SERPL-MCNC: 0.2 MG/DL (ref 0.2–1.2)
BLD GP AB SCN SERPL QL: NEGATIVE
BUN BLD-MCNC: 13 MG/DL (ref 6–20)
BUN/CREAT SERPL: 10.7 (ref 7–25)
CALCIUM SPEC-SCNC: 8.4 MG/DL (ref 8.6–10.5)
CHLORIDE SERPL-SCNC: 104 MMOL/L (ref 98–107)
CO2 SERPL-SCNC: 22 MMOL/L (ref 22–29)
CREAT BLD-MCNC: 1.22 MG/DL (ref 0.57–1)
CRP SERPL-MCNC: 14.51 MG/DL (ref 0–0.5)
DEPRECATED RDW RBC AUTO: 48.9 FL (ref 37–54)
EOSINOPHIL # BLD AUTO: 0.39 10*3/MM3 (ref 0–0.4)
EOSINOPHIL NFR BLD AUTO: 4 % (ref 0.3–6.2)
ERYTHROCYTE [DISTWIDTH] IN BLOOD BY AUTOMATED COUNT: 19.9 % (ref 12.3–15.4)
FOLATE SERPL-MCNC: 6.67 NG/ML (ref 4.78–24.2)
GFR SERPL CREATININE-BSD FRML MDRD: 47 ML/MIN/1.73
GLOBULIN UR ELPH-MCNC: 4.2 GM/DL
GLUCOSE BLD-MCNC: 155 MG/DL (ref 65–99)
GLUCOSE BLDC GLUCOMTR-MCNC: 145 MG/DL (ref 70–130)
GLUCOSE BLDC GLUCOMTR-MCNC: 268 MG/DL (ref 70–130)
GLUCOSE BLDC GLUCOMTR-MCNC: 295 MG/DL (ref 70–130)
HCT VFR BLD AUTO: 24.8 % (ref 34–46.6)
HCT VFR BLD AUTO: 29.5 % (ref 34–46.6)
HGB BLD-MCNC: 6.7 G/DL (ref 12–15.9)
HGB BLD-MCNC: 8 G/DL (ref 12–15.9)
HYPOCHROMIA BLD QL: NORMAL
IMM GRANULOCYTES # BLD AUTO: 0.04 10*3/MM3 (ref 0–0.05)
IMM GRANULOCYTES NFR BLD AUTO: 0.4 % (ref 0–0.5)
IRON 24H UR-MRATE: 13 MCG/DL (ref 37–145)
IRON SATN MFR SERPL: 3 % (ref 20–50)
LYMPHOCYTES # BLD AUTO: 2.03 10*3/MM3 (ref 0.7–3.1)
LYMPHOCYTES NFR BLD AUTO: 20.7 % (ref 19.6–45.3)
MCH RBC QN AUTO: 17.9 PG (ref 26.6–33)
MCHC RBC AUTO-ENTMCNC: 27 G/DL (ref 31.5–35.7)
MCV RBC AUTO: 66.3 FL (ref 79–97)
MICROCYTES BLD QL: NORMAL
MONOCYTES # BLD AUTO: 0.84 10*3/MM3 (ref 0.1–0.9)
MONOCYTES NFR BLD AUTO: 8.5 % (ref 5–12)
NEUTROPHILS # BLD AUTO: 6.51 10*3/MM3 (ref 1.4–7)
NEUTROPHILS NFR BLD AUTO: 66.2 % (ref 42.7–76)
OVALOCYTES BLD QL SMEAR: NORMAL
PLAT MORPH BLD: NORMAL
PLATELET # BLD AUTO: 428 10*3/MM3 (ref 140–450)
PMV BLD AUTO: 8.4 FL (ref 6–12)
POTASSIUM BLD-SCNC: 3.9 MMOL/L (ref 3.5–5.2)
PROT SERPL-MCNC: 7.1 G/DL (ref 6–8.5)
RBC # BLD AUTO: 3.74 10*6/MM3 (ref 3.77–5.28)
RH BLD: POSITIVE
RH BLD: POSITIVE
SODIUM BLD-SCNC: 138 MMOL/L (ref 136–145)
T&S EXPIRATION DATE: NORMAL
TIBC SERPL-MCNC: 384 MCG/DL (ref 298–536)
TRANSFERRIN SERPL-MCNC: 258 MG/DL (ref 200–360)
UNIT  ABO: NORMAL
UNIT  RH: NORMAL
VIT B12 BLD-MCNC: 301 PG/ML (ref 211–946)
WBC NRBC COR # BLD: 9.83 10*3/MM3 (ref 3.4–10.8)

## 2019-03-19 PROCEDURE — 85025 COMPLETE CBC W/AUTO DIFF WBC: CPT | Performed by: PHYSICIAN ASSISTANT

## 2019-03-19 PROCEDURE — 82746 ASSAY OF FOLIC ACID SERUM: CPT | Performed by: PHYSICIAN ASSISTANT

## 2019-03-19 PROCEDURE — 86140 C-REACTIVE PROTEIN: CPT | Performed by: PHYSICIAN ASSISTANT

## 2019-03-19 PROCEDURE — 84466 ASSAY OF TRANSFERRIN: CPT | Performed by: PHYSICIAN ASSISTANT

## 2019-03-19 PROCEDURE — 86923 COMPATIBILITY TEST ELECTRIC: CPT

## 2019-03-19 PROCEDURE — 86901 BLOOD TYPING SEROLOGIC RH(D): CPT

## 2019-03-19 PROCEDURE — 82607 VITAMIN B-12: CPT | Performed by: PHYSICIAN ASSISTANT

## 2019-03-19 PROCEDURE — 63710000001 INSULIN ASPART PER 5 UNITS: Performed by: HOSPITALIST

## 2019-03-19 PROCEDURE — 82962 GLUCOSE BLOOD TEST: CPT

## 2019-03-19 PROCEDURE — 83540 ASSAY OF IRON: CPT | Performed by: PHYSICIAN ASSISTANT

## 2019-03-19 PROCEDURE — 86900 BLOOD TYPING SEROLOGIC ABO: CPT

## 2019-03-19 PROCEDURE — 25010000002 PROMETHAZINE PER 50 MG: Performed by: PHYSICIAN ASSISTANT

## 2019-03-19 PROCEDURE — 85018 HEMOGLOBIN: CPT | Performed by: INTERNAL MEDICINE

## 2019-03-19 PROCEDURE — 25010000002 HEPARIN (PORCINE) PER 1000 UNITS: Performed by: HOSPITALIST

## 2019-03-19 PROCEDURE — 63710000001 INSULIN DETEMIR PER 5 UNITS: Performed by: INTERNAL MEDICINE

## 2019-03-19 PROCEDURE — 85007 BL SMEAR W/DIFF WBC COUNT: CPT | Performed by: PHYSICIAN ASSISTANT

## 2019-03-19 PROCEDURE — 99239 HOSP IP/OBS DSCHRG MGMT >30: CPT | Performed by: PHYSICIAN ASSISTANT

## 2019-03-19 PROCEDURE — 86850 RBC ANTIBODY SCREEN: CPT | Performed by: INTERNAL MEDICINE

## 2019-03-19 PROCEDURE — 80053 COMPREHEN METABOLIC PANEL: CPT | Performed by: PHYSICIAN ASSISTANT

## 2019-03-19 PROCEDURE — 85014 HEMATOCRIT: CPT | Performed by: INTERNAL MEDICINE

## 2019-03-19 PROCEDURE — 86900 BLOOD TYPING SEROLOGIC ABO: CPT | Performed by: INTERNAL MEDICINE

## 2019-03-19 PROCEDURE — 94799 UNLISTED PULMONARY SVC/PX: CPT

## 2019-03-19 PROCEDURE — 86901 BLOOD TYPING SEROLOGIC RH(D): CPT | Performed by: INTERNAL MEDICINE

## 2019-03-19 RX ORDER — DOXYCYCLINE HYCLATE 50 MG/1
324 CAPSULE, GELATIN COATED ORAL
Qty: 30 TABLET | Refills: 0 | Status: SHIPPED | OUTPATIENT
Start: 2019-03-20 | End: 2019-01-01

## 2019-03-19 RX ORDER — FERROUS GLUCONATE 324(37.5)
324 TABLET ORAL
Status: DISCONTINUED | OUTPATIENT
Start: 2019-03-19 | End: 2019-03-19 | Stop reason: HOSPADM

## 2019-03-19 RX ORDER — CEFDINIR 300 MG/1
300 CAPSULE ORAL EVERY 12 HOURS SCHEDULED
Qty: 15 CAPSULE | Refills: 0 | Status: SHIPPED | OUTPATIENT
Start: 2019-03-19 | End: 2019-03-27

## 2019-03-19 RX ORDER — CEFDINIR 300 MG/1
300 CAPSULE ORAL EVERY 12 HOURS SCHEDULED
Status: DISCONTINUED | OUTPATIENT
Start: 2019-03-19 | End: 2019-03-19 | Stop reason: HOSPADM

## 2019-03-19 RX ORDER — L.ACID,PARA/B.BIFIDUM/S.THERM 8B CELL
1 CAPSULE ORAL DAILY
Qty: 8 CAPSULE | Refills: 0 | Status: SHIPPED | OUTPATIENT
Start: 2019-03-20 | End: 2019-03-28

## 2019-03-19 RX ORDER — PROMETHAZINE HYDROCHLORIDE 12.5 MG/1
12.5 TABLET ORAL EVERY 6 HOURS PRN
Status: DISCONTINUED | OUTPATIENT
Start: 2019-03-19 | End: 2019-03-19 | Stop reason: HOSPADM

## 2019-03-19 RX ADMIN — SODIUM CHLORIDE 50 ML/HR: 9 INJECTION, SOLUTION INTRAVENOUS at 05:21

## 2019-03-19 RX ADMIN — DOXYCYCLINE 100 MG: 100 INJECTION, POWDER, LYOPHILIZED, FOR SOLUTION INTRAVENOUS at 05:21

## 2019-03-19 RX ADMIN — INSULIN DETEMIR 45 UNITS: 100 INJECTION, SOLUTION SUBCUTANEOUS at 11:50

## 2019-03-19 RX ADMIN — AMLODIPINE BESYLATE 5 MG: 5 TABLET ORAL at 08:57

## 2019-03-19 RX ADMIN — CEFDINIR 300 MG: 300 CAPSULE ORAL at 11:47

## 2019-03-19 RX ADMIN — ASPIRIN 81 MG: 81 TABLET ORAL at 08:57

## 2019-03-19 RX ADMIN — FERROUS GLUCONATE TAB 324 MG (37.5 MG ELEMENTAL IRON) 324 MG: 324 (37.5 FE) TAB at 11:47

## 2019-03-19 RX ADMIN — INSULIN ASPART 4 UNITS: 100 INJECTION, SOLUTION INTRAVENOUS; SUBCUTANEOUS at 11:48

## 2019-03-19 RX ADMIN — CARVEDILOL 18.75 MG: 6.25 TABLET, FILM COATED ORAL at 17:25

## 2019-03-19 RX ADMIN — DESVENLAFAXINE SUCCINATE 50 MG: 50 TABLET, EXTENDED RELEASE ORAL at 08:58

## 2019-03-19 RX ADMIN — HYDROCODONE BITARTRATE AND ACETAMINOPHEN 1 TABLET: 10; 325 TABLET ORAL at 08:58

## 2019-03-19 RX ADMIN — TIZANIDINE 8 MG: 4 TABLET ORAL at 08:56

## 2019-03-19 RX ADMIN — CARVEDILOL 18.75 MG: 6.25 TABLET, FILM COATED ORAL at 08:56

## 2019-03-19 RX ADMIN — GABAPENTIN 400 MG: 400 CAPSULE ORAL at 15:22

## 2019-03-19 RX ADMIN — PROMETHAZINE HYDROCHLORIDE 6.25 MG: 25 INJECTION INTRAMUSCULAR; INTRAVENOUS at 09:32

## 2019-03-19 RX ADMIN — GABAPENTIN 400 MG: 400 CAPSULE ORAL at 08:57

## 2019-03-19 RX ADMIN — HEPARIN SODIUM 5000 UNITS: 5000 INJECTION INTRAVENOUS; SUBCUTANEOUS at 08:56

## 2019-03-19 RX ADMIN — HYDROCODONE BITARTRATE AND ACETAMINOPHEN 1 TABLET: 10; 325 TABLET ORAL at 15:22

## 2019-03-19 RX ADMIN — INSULIN ASPART 4 UNITS: 100 INJECTION, SOLUTION INTRAVENOUS; SUBCUTANEOUS at 17:26

## 2019-03-19 RX ADMIN — Medication 1 CAPSULE: at 08:57

## 2019-03-19 RX ADMIN — CETIRIZINE HCL 10 MG: 10 TABLET ORAL at 08:57

## 2019-03-19 NOTE — PROGRESS NOTES
Patient Identification:  Name:  Yissel Lopez  Age:  49 y.o.  Sex:  female  :  1970  MRN:  [id number]  Visit Number:  [id number]  Primary Care Provider:  Gardenia Weiss APRN    Length of stay:  2    Subjective     Chief complaint: Follow-up pneumonia and UTI    HPI:   48-year-old  female with past medical history significant for chronic pain syndrome, diabetes mellitus type 2, obesity and in the process of being worked up for possible multiple sclerosis.  She presented to the emergency room on 2019 with reports of fever and cough in addition to wheezing and body aches.  She was found to have initial temperature of 103.1 °F, pulse 112, respiration rate of 22 and a pressure of 108/72.  Urinalysis revealed concern for urinary tract infection in addition there was some concern regarding possible pneumonia.    Subjective:      Mrs. Lopez reports she is very nauseous this morning.  We discuss anemia and blood transfusion. She reports known history of iron deficiency.  She and her  now think she may have had some endoscopy studies in the distant past 2/2 abdominal pain without known abnormalities.  She denies chest pain. She thinks they were performed at this facility.   I cannot find records within current EMR at present.  Will further evaluated old EMR, if possible.      Discussed with AM CHRIS Guerrero with no acute events overnight.   ----------------------------------------------------------------------------------------------------------------------  Current Hospital Meds:    amLODIPine 5 mg Oral Daily   aspirin 81 mg Oral Daily   atorvastatin 20 mg Oral Nightly   carvedilol 18.75 mg Oral BID With Meals   cefdinir 300 mg Oral Q12H   cetirizine 10 mg Oral Daily   desvenlafaxine 50 mg Oral Daily   ferrous gluconate 324 mg Oral Daily With Breakfast   gabapentin 400 mg Oral TID   heparin (porcine) 5,000 Units Subcutaneous Q12H   HYDROcodone-acetaminophen 1 tablet Oral TID   insulin aspart  0-7 Units Subcutaneous 4x Daily AC & at Bedtime   insulin aspart 0-7 Units Subcutaneous Once   insulin detemir 45 Units Subcutaneous Daily   lactobacillus acidophilus 1 capsule Oral Daily   LORazepam 0.5 mg Oral Nightly   sennosides-docusate sodium 2 tablet Oral Nightly   sodium chloride 3 mL Intravenous Q12H        ----------------------------------------------------------------------------------------------------------------------      Objective     Vital Signs:  Temp:  [98.1 °F (36.7 °C)-99.4 °F (37.4 °C)] 99.4 °F (37.4 °C)  Heart Rate:  [78-94] 91  Resp:  [18] 18  BP: ()/(47-82) 163/73      03/17/19  1657 03/18/19  0427 03/19/19  0428   Weight: 109 kg (241 lb 1.6 oz) 108 kg (237 lb 3.2 oz) 106 kg (232 lb 12.8 oz)     Body mass index is 41.24 kg/m².    Intake/Output Summary (Last 24 hours) at 3/19/2019 1019  Last data filed at 3/19/2019 0624  Gross per 24 hour   Intake --   Output 400 ml   Net -400 ml     No intake/output data recorded.  Diet Regular; Consistent Carbohydrate  ----------------------------------------------------------------------------------------------------------------------  Physical exam:  Constitutional:  Well-developed and well-nourished.  No respiratory distress.      HENT:  Head:  Normocephalic and atraumatic.  Mouth:  Moist mucous membranes.    Eyes:  Conjunctivae and EOM are normal.  Pupils are equal, round, and reactive to light.  No scleral icterus.    Neck:  Neck supple.  No JVD present.    Cardiovascular:  Regular rate, regular rhythm and normal heart sounds with no murmur.  Pulmonary/Chest:  No respiratory distress, no wheezes, no crackles, with normal breath sounds and good air movement.  Abdominal:  Soft.  Bowel sounds are normal.  No distension and no tenderness.   Musculoskeletal:  Mild non-pitting edema. No tenderness, and no deformity.  No red or swollen joints appreciated during examination.    Neurological:  Alert and oriented to person, place, and time.  No cranial  nerve deficit.  No tongue deviation.  No facial droop.  No slurred speech.   Skin:  Skin is warm and dry. No rash noted. No pallor.   ----------------------------------------------------------------------------------------------------------------------  Tele:  SR/ST 80s-110s  ----------------------------------------------------------------------------------------------------------------------      Results from last 7 days   Lab Units 03/19/19  0407 03/18/19  1136 03/18/19  0539 03/18/19  0028  03/17/19  1257   CRP mg/dL 14.51*  --   --  21.15*  --   --    LACTATE mmol/L  --  1.3 1.2 1.1   < > 4.0*   WBC 10*3/mm3 9.83  --   --  13.54*  --  7.63   HEMOGLOBIN g/dL 6.7*  --   --  7.3*  --  7.3*   HEMATOCRIT % 24.8*  --   --  27.1*  --  27.2*   MCV fL 66.3*  --   --  66.1*  --  65.9*   MCHC g/dL 27.0*  --   --  26.9*  --  26.8*   PLATELETS 10*3/mm3 428  --   --  541*  --  535*    < > = values in this interval not displayed.     Results from last 7 days   Lab Units 03/17/19  1304   PH, ARTERIAL pH units 7.416   PO2 ART mm Hg 44.0*   PCO2, ARTERIAL mm Hg 37.2   HCO3 ART mmol/L 23.4     Results from last 7 days   Lab Units 03/19/19  0407 03/18/19  0028 03/17/19  1257   SODIUM mmol/L 138 139 139   POTASSIUM mmol/L 3.9 4.2 4.7   MAGNESIUM mg/dL  --  1.9  --    CHLORIDE mmol/L 104 103 101   CO2 mmol/L 22.0 23.0 22.9   BUN mg/dL 13 14 16   CREATININE mg/dL 1.22* 1.14* 1.23*   EGFR IF NONAFRICN AM mL/min/1.73 47* 51* 47*   CALCIUM mg/dL 8.4* 8.7 8.7   GLUCOSE mg/dL 155* 225* 276*   ALBUMIN g/dL 2.91*  --  3.77   BILIRUBIN mg/dL 0.2  --  0.5   ALK PHOS U/L 99  --  190*   AST (SGOT) U/L 16  --  20   ALT (SGPT) U/L 11  --  14   Estimated Creatinine Clearance: 65.7 mL/min (A) (by C-G formula based on SCr of 1.22 mg/dL (H)).  No results found for: AMMONIA      Blood Culture   Date Value Ref Range Status   03/17/2019 No growth at less than 24 hours  Preliminary   03/17/2019 No growth at less than 24 hours  Preliminary   03/17/2019 No  growth at 24 hours  Preliminary   03/17/2019 No growth at 24 hours  Preliminary     Urine Culture   Date Value Ref Range Status   03/17/2019 >100,000 CFU/mL Gram Negative Bacilli (A)  Preliminary        Microbiology Results (last 10 days)     Procedure Component Value - Date/Time    Blood Culture - Blood, Arm, Left [[id number]] Collected:  03/17/19 2031    Lab Status:  Preliminary result Specimen:  Blood from Arm, Left Updated:  03/18/19 2045     Blood Culture No growth at 24 hours    Blood Culture - Blood, Wrist, Right [[id number]] Collected:  03/17/19 1754    Lab Status:  Preliminary result Specimen:  Blood from Wrist, Right Updated:  03/18/19 1830     Blood Culture No growth at 24 hours    Urine Culture - Urine, Urine, Clean Catch [[id number]]  (Abnormal)  (Susceptibility) Collected:  03/17/19 1515    Lab Status:  Final result Specimen:  Urine, Clean Catch Updated:  03/19/19 0719     Urine Culture >100,000 CFU/mL Escherichia coli    Susceptibility      Escherichia coli     BRIONNA     Ampicillin Resistant     Ampicillin + Sulbactam Intermediate     Cefazolin Susceptible     Cefepime Susceptible     Ceftazidime Susceptible     Ceftriaxone Susceptible     Gentamicin Susceptible     Levofloxacin Susceptible     Nitrofurantoin Susceptible     Piperacillin + Tazobactam Susceptible     Tetracycline Susceptible     Trimethoprim + Sulfamethoxazole Resistant                    Legionella Antigen, Urine - Urine, Urine, Clean Catch [[id number]]  (Normal) Collected:  03/17/19 1515    Lab Status:  Final result Specimen:  Urine, Clean Catch Updated:  03/17/19 1855     LEGIONELLA ANTIGEN, URINE Negative    Narrative:       Presumptive negative for L. pneumophilia serogroup 1 antigen, suggesting no recent or current infection.    Blood Culture - Blood, Arm, Left [[id number]] Collected:  03/17/19 1258    Lab Status:  Preliminary result Specimen:  Blood from Arm, Left Updated:  03/18/19 1315     Blood Culture No growth at 24 hours     Influenza Antigen, Rapid - Swab, Nasopharynx [[id number]]  (Normal) Collected:  03/17/19 1258    Lab Status:  Final result Specimen:  Swab from Nasopharynx Updated:  03/17/19 1333     Influenza A Ag, EIA Negative     Influenza B Ag, EIA Negative    Blood Culture - Blood, Arm, Right [[id number]] Collected:  03/17/19 1257    Lab Status:  Preliminary result Specimen:  Blood from Arm, Right Updated:  03/18/19 1315     Blood Culture No growth at 24 hours             ----------------------------------------------------------------------------------------------------------------------  Imaging Results (last 24 hours)     Procedure Component Value Units Date/Time    XR Chest PA & Lateral [[id number]] Collected:  03/18/19 1517     Updated:  03/18/19 1519    Narrative:       EXAMINATION: XR CHEST PA AND LATERAL-      CLINICAL INDICATION: Eval for pneumonia, recommended on cxr report;  J18.9-Pneumonia, unspecified organism          COMPARISON: 3/17/2019      TECHNIQUE: XR CHEST PA AND LATERAL-      FINDINGS:   Lungs are adequately aerated.   Heart and mediastinal contours are unremarkable.   No pneumothorax.   Bony and soft tissue structures are unremarkable.            Impression:       No radiographic evidence of acute cardiac or pulmonary disease.     This report was finalized on 3/18/2019 3:17 PM by Dr. Loy Lerner MD.           ----------------------------------------------------------------------------------------------------------------------      Assessment/Plan     Assessment and Plan:  · Severe Sepsis, present on admission, likely 2/2 UTI with culture growing gram negative bacilli: Blood cultures with no growth to date. Urine culture growing E.coli sensitive to cephalosporins. CXR without acute infiltrate.  Questionable pneumonia on admission.  IV Rocephin transitioned to oral omnicef. C-RP improving. WBC WNL.     Hyperglycemia with diabetes mellitus type 2: Given diabetes mellitus, fingerstick blood glucose  "monitoring has been continued AC&HS. Sliding scale insulin has been ordered.  Hemoglobin a1c 7.7%. Continue Levemir 45u daily.      · Acute kidney injury:  Creatinine 1.22 with mild increased on this date.     · Microcytic anemia: Hgb drop this AM to 6.7.  Tranfusion ordered x1 unit.  Significant iron deficiency.  Evaluated old EMR regarding reported old \"scopes\". She did have two ex laps in 2004 for pelvis pain with abdominal adhesions but there were not records from colonoscopy or EGD. Will follow-up hgb in AM.        · Thrombocytosis, resolved:  Continue to follow.      · Chronic pain: Norco continued.  I have personally reviewed the [facility] report for this patient.    · Morbid obesity with BMI of 42.02 kg/m²    · History of right MCA CVA: Wheelchair bound at baseline with assistance of  for daily activities of living.       · History of possible MS with cerebellar atrophy: Dr. Lopez notes from 11/2017 reviewed.       -----------  -DVT prophylaxis: SQ Heparin as she does not have known acute blood loss.    -GI prophylaxis: Pepcid added.   -Activity: AD juan, wheelchair bound at baseline  -Disposition: Remains hospitalized for further IV abx as cultures finalize.  -Diet: Consistent carb    The patient is high risk due to the following diagnoses/reasons:  Sepsis on admission with UTI, acute kidney injury, chronic pain medication    Lana Lamar PA-C  03/19/19  10:19 AM  Pager # [phone]    "

## 2019-03-19 NOTE — DISCHARGE SUMMARY
[facility] Medicine Services  DISCHARGE SUMMARY    Date of Admission: 3/17/2019    Date of Discharge:  3/19/2019    PCP: Gardenia Weiss APRN    Discharging Provider: Lana Lamar PA-C     Admission Diagnosis:   Please see admission H&P    Discharge Diagnosis:   · Severe sepsis, present on admission, secondary to UTI  · Urination urinary tract infection with growth E. coli  · Hyperglycemia in the setting of diabetes mellitus type 2  · Acute kidney injury versus underlying chronic kidney disease  · Microcytic anemia with severe iron deficiency  · Thrombocytosis, resolved  · Chronic pain  · Morbid obesity with BMI of 42.02 kg/m²  · History of right MCA CVA  · History of possible neuromuscular disorder with cerebellar atrophy  · Iron deficiency     Procedures Performed:  -1 unit of PRBCs on 3/19/2019     HPI     History of Present Illness:  Yissel Lopez is a 49 y.o. female with past medical history significant for chronic pain syndrome, diabetes mellitus type 2, obesity and with possible underlying neuromuscular disease.  She presented to the emergency room on March 17, 2019 with reports of fever and cough in addition to wheezing and body aches.  She was found to have initial temperature of 103.1 °F, pulse 112, respiration rate of 22 and a pressure of 108/72.  Urinalysis revealed concern for urinary tract infection in addition there was some concern regarding possible pneumonia.      Hospital Course     Hospital Course  Yissel Lopez was admitted as outlined in above HPI.  She was initially started on IV Rocephin and IV doxycycline given concern for possible right lower lobe pneumonia in addition to urinary tract infection.  On March 2019 2013 repeat 2 view chest x-ray did not demonstrate acute infiltration.  IV doxycycline was discontinued with negative atypical testing.  Lactic acid did normalize during hospitalization.  IV Rocephin was continued.  Given hemoglobin of 7.3 on 317  and 318 with known history of iron deficiency.  Iron studies were done and she was found to have severe iron deficiency.    Mrs. Lopez is bedbound at baseline and her  is her primary caretaker.  She has had previous neurological workup of multiple sclerosis in the past.    On March 19, 2019, urine culture to finalize with E. coli susceptible to cephalosporins.  However, initial hemoglobin this morning was 6.7 down from 7.3 on prior days in setting of likely dilution.  She did receive 1 unit of packed red blood cells with repeat hemoglobin checked on this date with improvement to 8.  Repeat hemoglobin was ordered for Friday, March 22, 2019.  It was recommended that she have outpatient endoscopies upon discharge at the discretion of her primary care provider.  She was also started on oral iron therapies.    Discussed with AM CHRIS Guerrero on day of discharge.     Telemetry on day of discharge SR in the 90s.     Her room air oxygen saturation on the day of discharge was 92-97%.    Given borderline creatinine during admission, oral diabetic medications and naproxen were held upon discharge.  He was also ordered to be obtained  Upon discharge, given worsening and ongoing debility with bedbound status and multiple medical comorbidities home health was ordered for medication management and to help obtain labs.    Upon return of urine culture, IV Rocephin was transitioned to oral omnicef.     Pertinent Laboratory and Radiology Results     Pertinent Test Results:      Results from last 7 days   Lab Units 03/17/19  1304   PH, ARTERIAL pH units 7.416   PO2 ART mm Hg 44.0*   PCO2, ARTERIAL mm Hg 37.2   HCO3 ART mmol/L 23.4     Results from last 7 days   Lab Units 03/19/19  1604 03/19/19  0407 03/18/19  0028 03/17/19  1257   WBC 10*3/mm3  --  9.83 13.54* 7.63   HEMOGLOBIN g/dL 8.0* 6.7* 7.3* 7.3*   HEMATOCRIT % 29.5* 24.8* 27.1* 27.2*   PLATELETS 10*3/mm3  --  428 541* 535*   MCV fL  --  66.3* 66.1* 65.9*   SODIUM mmol/L  --   138 139 139   POTASSIUM mmol/L  --  3.9 4.2 4.7   CHLORIDE mmol/L  --  104 103 101   CO2 mmol/L  --  22.0 23.0 22.9   BUN mg/dL  --  13 14 16   CREATININE mg/dL  --  1.22* 1.14* 1.23*   GLUCOSE mg/dL  --  155* 225* 276*   CALCIUM mg/dL  --  8.4* 8.7 8.7          Results from last 7 days   Lab Units 03/19/19  0407 03/18/19  1136 03/18/19  0539 03/18/19  0028 03/17/19  1700 03/17/19  1257   CRP mg/dL 14.51*  --   --  21.15*  --   --    LACTATE mmol/L  --  1.3 1.2 1.1 2.3* 4.0*   WBC 10*3/mm3 9.83  --   --  13.54*  --  7.63     Results from last 7 days   Lab Units 03/19/19  0407 03/17/19  1257   BILIRUBIN mg/dL 0.2 0.5   ALK PHOS U/L 99 190*   ALT (SGPT) U/L 11 14   AST (SGOT) U/L 16 20           Invalid input(s): TG, LDLCALC, LDLREALC  Results from last 7 days   Lab Units 03/17/19  1754   HEMOGLOBIN A1C % 7.70*     Brief Urine Lab Results  (Last result in the past 365 days)      Color   Clarity   Blood   Leuk Est   Nitrite   Protein   CREAT   Urine HCG        03/17/19 1515 Yellow Cloudy Small (1+) Moderate (2+) Negative 30 mg/dL (1+)                        Results for orders placed during the hospital encounter of 08/25/17   Adult Transthoracic Echo Complete    Narrative · Normal left ventricular cavity size and wall thickness noted. All left   ventricular wall segments contract normally.  · Estimated EF appears to be in the range of 61 - 65%.  · The aortic valve is structurally normal. No aortic valve regurgitation   is present. No aortic valve stenosis is present.  · The mitral valve is normal in structure. No mitral valve regurgitation   is present. No significant mitral valve stenosis is present.  · The tricuspid valve is normal. No tricuspid valve stenosis is present.   No tricuspid valve regurgitation is present.  · There is no evidence of pericardial effusion.           Order Current Status    Blood Culture - Blood, Arm, Left Preliminary result    Blood Culture - Blood, Arm, Left Preliminary result    Blood  Culture - Blood, Arm, Right Preliminary result    Blood Culture - Blood, Wrist, Right Preliminary result            ----------------------------------------------------------------------------------------------------------------------  Xr Chest Ap    Result Date: 3/17/2019  No evidence of active or acute cardiopulmonary disease on today's chest radiograph.     This report was finalized on 3/17/2019 8:00 PM by Dr. Loy Lerner MD.      Xr Chest Pa & Lateral    Result Date: 3/18/2019  No radiographic evidence of acute cardiac or pulmonary disease.  This report was finalized on 3/18/2019 3:17 PM by Dr. Loy Lerner MD.          Microbiology Results (last 10 days)     Procedure Component Value - Date/Time    Blood Culture - Blood, Arm, Left [[id number]] Collected:  03/17/19 2031    Lab Status:  Preliminary result Specimen:  Blood from Arm, Left Updated:  03/18/19 2045     Blood Culture No growth at 24 hours    Blood Culture - Blood, Wrist, Right [[id number]] Collected:  03/17/19 1754    Lab Status:  Preliminary result Specimen:  Blood from Wrist, Right Updated:  03/19/19 1830     Blood Culture No growth at 2 days    Urine Culture - Urine, Urine, Clean Catch [[id number]]  (Abnormal)  (Susceptibility) Collected:  03/17/19 1515    Lab Status:  Final result Specimen:  Urine, Clean Catch Updated:  03/19/19 0719     Urine Culture >100,000 CFU/mL Escherichia coli    Susceptibility      Escherichia coli     BRIONNA     Ampicillin Resistant     Ampicillin + Sulbactam Intermediate     Cefazolin Susceptible     Cefepime Susceptible     Ceftazidime Susceptible     Ceftriaxone Susceptible     Gentamicin Susceptible     Levofloxacin Susceptible     Nitrofurantoin Susceptible     Piperacillin + Tazobactam Susceptible     Tetracycline Susceptible     Trimethoprim + Sulfamethoxazole Resistant                    Legionella Antigen, Urine - Urine, Urine, Clean Catch [[id number]]  (Normal) Collected:  03/17/19 1515    Lab Status:  Final result  Specimen:  Urine, Clean Catch Updated:  03/17/19 1855     LEGIONELLA ANTIGEN, URINE Negative    Narrative:       Presumptive negative for L. pneumophilia serogroup 1 antigen, suggesting no recent or current infection.    Blood Culture - Blood, Arm, Left [[id number]] Collected:  03/17/19 1258    Lab Status:  Preliminary result Specimen:  Blood from Arm, Left Updated:  03/19/19 1315     Blood Culture No growth at 2 days    Influenza Antigen, Rapid - Swab, Nasopharynx [[id number]]  (Normal) Collected:  03/17/19 1258    Lab Status:  Final result Specimen:  Swab from Nasopharynx Updated:  03/17/19 1333     Influenza A Ag, EIA Negative     Influenza B Ag, EIA Negative    Blood Culture - Blood, Arm, Right [[id number]] Collected:  03/17/19 1257    Lab Status:  Preliminary result Specimen:  Blood from Arm, Right Updated:  03/19/19 1315     Blood Culture No growth at 2 days            Discharge Vitals and Physical Examination       Vital Signs  Temp:  [98 °F (36.7 °C)-99.4 °F (37.4 °C)] 98 °F (36.7 °C)  Heart Rate:  [79-96] 93  Resp:  [18] 18  BP: ()/(47-95) 140/95     Physical Exam:  General:    Awake, alert, in no acute distress   Heart:      Normal S1 and S2. Regular rate and rhythm. No significant murmur, rubs or gallops appreciated.   Lungs:     Respirations regular, even and unlabored. Lungs clear to auscultation B/L. No wheezes, rales or rhonchi.   Abdomen:   Soft and nontender. No guarding, rebound tenderness or  organomegaly noted. Bowel sounds present x 4.   Extremities:  No clubbing, cyanosis or edema noted. Moves UE and LE equally B/L.         Discharge Disposition, Discharge Medications, and Discharge Appointments     Discharge Disposition:   home    Condition on Discharge:  Fair    Discharge Medications:     Discharge Medications      New Medications      Instructions Start Date   cefdinir 300 MG capsule  Commonly known as:  OMNICEF   300 mg, Oral, Every 12 Hours Scheduled      ferrous gluconate 324 MG  tablet  Commonly known as:  FERGON   324 mg, Oral, Daily With Breakfast      PROBIOTIC ADVANCED capsule   1 capsule, Oral, Daily         Changes to Medications      Instructions Start Date   insulin aspart 100 UNIT/ML injection  Commonly known as:  novoLOG  What changed:  how much to take   0-7 Units, Subcutaneous, 4 Times Daily Before Meals & Nightly         Continue These Medications      Instructions Start Date   amLODIPine 5 MG tablet  Commonly known as:  NORVASC   5 mg, Oral, Daily      aspirin 81 MG EC tablet   81 mg, Oral, Daily      atorvastatin 20 MG tablet  Commonly known as:  LIPITOR   20 mg, Oral, Nightly      carvedilol 12.5 MG tablet  Commonly known as:  COREG   18.75 mg, Oral, 2 Times Daily With Meals      desvenlafaxine 50 MG 24 hr tablet  Commonly known as:  PRISTIQ   50 mg, Oral, Daily      gabapentin 600 MG tablet  Commonly known as:  NEURONTIN   1,200 mg, Oral, 3 Times Daily      HYDROcodone-acetaminophen  MG per tablet  Commonly known as:  NORCO   1 tablet, Oral, 3 Times Daily      insulin detemir 100 UNIT/ML injection  Commonly known as:  LEVEMIR   55 Units, Subcutaneous, Daily at 1100      levocetirizine 5 MG tablet  Commonly known as:  XYZAL   5 mg, Oral, Every Evening      LORazepam 0.5 MG tablet  Commonly known as:  ATIVAN   0.5 mg, Oral, Nightly      tiZANidine 4 MG tablet  Commonly known as:  ZANAFLEX   8 mg, Oral, 3 Times Daily PRN         Stop These Medications    glyBURIDE-metFORMIN 5-500 MG per tablet  Commonly known as:  GLUCOVANCE     naproxen 500 MG tablet  Commonly known as:  NAPROSYN            Discharged medication regimen discussed with attending physician prior to discharge.     Discharge Diet:   diabetic diet  Dietary Orders (From admission, onward)    Start     Ordered    03/17/19 7436  Diet Regular; Consistent Carbohydrate  Diet Effective Now     Question Answer Comment   Diet Texture / Consistency Regular    Common Modifiers Consistent Carbohydrate        03/17/19  1707          Activity at Discharge:  activity as tolerated         Discharge Disposition:    Home or Self Care        Follow-up Appointments:      Additional Instructions for the Follow-ups that You Need to Schedule     Discharge Follow-up with PCP   As directed       Currently Documented PCP:    Moi Lazaro APRN    PCP Phone Number:    [phone]     Follow Up Details:  one week follow-up with hospitalization for UTI         Referral to Home Health   As directed      Obtain CBC&BMP on Friday to sent to PCP Moi Lazaro.    Order Comments:  Obtain CBC&BMP on Friday to sent to PCP Moi Lazaro.     Face to Face Visit Date:  3/19/2019    Follow-up Provider for Plan of Care?:  I will be treating the patient on an ongoing basis.  Please send me the Plan of Care for signature.    Follow-up Provider:  MOI LAZARO [[id number]]    Reason/Clinical Findings:  ongoing debility with underyling neuromuscular disorder    Describe mobility limitations that make leaving home difficult:  Bedbound with multiple medical comorbidities    Nursing/Therapeutic Services Requested:  Skilled Nursing    Skilled nursing orders:  Medication education    Frequency:  1 Week 1         Basic Metabolic Panel    Mar 22, 2019 (Approximate)      To be performed by home health.  TO be sent to Moi Lazaro NP    Order Comments:  To be performed by home health.  TO be sent to Moi Lazaro NP          CBC & Differential    Mar 22, 2019 (Approximate)      To be performed by home health.  Send results to PCP Moi Lazaro    Order Comments:  To be performed by home health.  Send results to PCP Moi Lazaro     Manual Differential:  No           Follow-up Information     Moi Lazaro APRN Follow up in 1 week(s).    Specialty:  Family Medicine  Why:  MAR 26 @11:30AM  Contact information:  [address]  [address] [address] [phone]                   Additional Instructions for the Follow-ups that You Need to Schedule     Discharge Follow-up with PCP    As directed       Currently Documented PCP:    Moi Lazaro APRN    PCP Phone Number:    [phone]     Follow Up Details:  one week follow-up with hospitalization for UTI         Referral to Home Health   As directed      Obtain CBC&BMP on Friday to sent to PCP Moi Lazaro.    Order Comments:  Obtain CBC&BMP on Friday to sent to PCP Moi Lazaro.     Face to Face Visit Date:  3/19/2019    Follow-up Provider for Plan of Care?:  I will be treating the patient on an ongoing basis.  Please send me the Plan of Care for signature.    Follow-up Provider:  MOI LAZARO [[id number]]    Reason/Clinical Findings:  ongoing debility with underyling neuromuscular disorder    Describe mobility limitations that make leaving home difficult:  Bedbound with multiple medical comorbidities    Nursing/Therapeutic Services Requested:  Skilled Nursing    Skilled nursing orders:  Medication education    Frequency:  1 Week 1         Basic Metabolic Panel    Mar 22, 2019 (Approximate)      To be performed by home health.  TO be sent to Moi Lazaro NP    Order Comments:  To be performed by home health.  TO be sent to Moi Lazaro NP          CBC & Differential    Mar 22, 2019 (Approximate)      To be performed by home health.  Send results to PCP Moi Lazaro    Order Comments:  To be performed by home health.  Send results to PCP Moi Lazaro     Manual Differential:  No               Test Results Pending at Discharge:     Order Current Status    Blood Culture - Blood, Arm, Left Preliminary result    Blood Culture - Blood, Arm, Left Preliminary result    Blood Culture - Blood, Arm, Right Preliminary result    Blood Culture - Blood, Wrist, Right Preliminary result             Lana Lamar PA-C  Hospital Medicine Team  03/19/19  7:04 PM      Time: Greater than 30 minutes spent on this discharge.

## 2019-03-20 NOTE — DISCHARGE PLACEMENT REQUEST
"Favian Lopez (49 y.o. Female)     Date of Birth Social Security Number Address Home Phone MRN    1970  PO   [facility] [address] [phone] [id number]    Zoroastrianism Marital Status          Yarsani        Admission Date Admission Type Admitting Provider Attending Provider Department, Room/Bed    3/17/19 Emergency OculamAmy MD  [address], 3316/1S    Discharge Date Discharge Disposition Discharge Destination        3/19/2019 Home or Self Care              Attending Provider:  (none)   Allergies:  Erythromycin, Tramadol    Isolation:  None   Infection:  None   Code Status:  Prior    Ht:  160 cm (63\")   Wt:  106 kg (232 lb 12.8 oz)    Admission Cmt:  None   Principal Problem:  None                Active Insurance as of 3/17/2019     Primary Coverage     Payor Plan Insurance Group Employer/Plan Group    [place]      Payor Plan Address Payor Plan Phone Number Payor Plan Fax Number Effective Dates    [address] [phone]  12/6/2017 - None Entered    [facility] [address]       Subscriber Name Subscriber Birth Date Member ID       FAVIAN LOPEZ 1970 [id number]                 Emergency Contacts      (Rel.) Home Phone Work Phone Mobile Phone    Hunter Lopez (Spouse) [phone] -- --            Emergency Contact Information     Name Relation Home Work Mobile    Hunter Lopez Spouse [phone]            Insurance Information                [place] Phone: [phone]    Subscriber: Favian Lopez Subscriber#: [id number]    Group#:  Precert#:           Treatment Team     Provider Relationship Specialty Contact    Prema Moseley Certified Nursing Assistant --     Nelson Min MD Physician of Record Hospitalist [phone]    Leah Prater, RRT Respiratory Therapist --     Loy Nam RN Registered Nurse --     Brenda Carrion Patient Care Technician --     " Dona Thompson, RRT Respiratory Therapist Respiratory Therapy     Damion Garcia, RN Registered Nurse --           Problem List           Codes Noted - Resolved       Hospital    Severe sepsis (CMS/HCC) ICD-10-CM: A41.9, R65.20  ICD-9-CM: 038.9, 995.92 3/18/2019 - Present    Acute UTI (urinary tract infection) ICD-10-CM: N39.0  ICD-9-CM: 599.0 3/18/2019 - Present    Pneumonia due to infectious organism ICD-10-CM: J18.9  ICD-9-CM: 136.9, 484.8 3/17/2019 - Present       Non-Hospital    Bacteremia ICD-10-CM: R78.81  ICD-9-CM: 790.7 2017 - Present    Polyneuropathy ICD-10-CM: G62.9  ICD-9-CM: 356.9 2017 - Present    Syncope and collapse ICD-10-CM: R55  ICD-9-CM: 780.2 2017 - Present             History & Physical      OculamAmy MD at 3/17/2019  3:40 PM              [facility]YSICAL    Patient Identification:  Name:  Yissel Lopez  Age:  48 y.o.  Sex:  female  :  1970  MRN:  [id number]   Visit Number:  [id number]  Room number:  115/15  Primary Care Physician:  Gardenia Weiss APRN     Chief complaint: Fever and cough with shortness of breath  Chief Complaint   Patient presents with   • Abdominal Pain       History of presenting illness:  48 y.o. female chronic pain syndrome diabetes type 2 obesity she is also being worked up for possible multiple sclerosis but it is not confirmed yet, presented to emergency room with chief complaint of fever coughing occasional wheezing with diffuse aches.  It started 2 days ago no rhinorrhea, no sore throat coughing was described as productive with yellow sputum associated with poor intake and nausea.  She denies dysuria or polyuria.  Denies diarrhea.  Denies reports of member family with flu, denies recent travel.  At the emergency room her temperature was 103.1 pulse was 112 respiration 22 temperature is 108/72.  She also has hypoxemia with room also saturation initially was 62 it improved with  nebulization and oxygen support supplementation.    She received Levaquin 750 mg IV x1, a 10 mg of ibuprofen and Tylenol 975 mg.  She also received a bolus of 500 cc normal saline.  X-ray revealed possible infiltrate right lower lung field and urinalysis shows UTI.  Repeat temperature is now 100.  Her lactic acid is elevated at 4 and also some acute kidney injury with creatinine of 1.23.  Flu a and B is negative.  Because of this she will be admitted for further treatment.    ---------------------------------------------------------------------------------------------------------------------   Review of Systems   Constitutional: Positive for chills and fever. Negative for activity change, appetite change, diaphoresis, fatigue and unexpected weight change.   HENT: Negative for congestion, dental problem, ear pain, mouth sores, nosebleeds, postnasal drip, rhinorrhea, sinus pressure, sinus pain, sneezing, sore throat, tinnitus, trouble swallowing and voice change.    Eyes: Negative for photophobia, discharge, redness, itching and visual disturbance.   Respiratory: Positive for cough, shortness of breath and wheezing. Negative for apnea, choking, chest tightness and stridor.    Cardiovascular: Negative for chest pain, palpitations and leg swelling.   Gastrointestinal: Negative for abdominal distention, abdominal pain, anal bleeding, blood in stool, constipation, diarrhea, nausea, rectal pain and vomiting.   Endocrine: Negative for cold intolerance, heat intolerance, polydipsia, polyphagia and polyuria.   Genitourinary: Negative for decreased urine volume, difficulty urinating, dyspareunia, dysuria, enuresis, genital sores, menstrual problem, pelvic pain, vaginal bleeding and vaginal pain.   Musculoskeletal: Positive for arthralgias, back pain and myalgias. Negative for gait problem, joint swelling, neck pain and neck stiffness.   Skin: Negative for color change, pallor, rash and wound.   Neurological: Positive for  weakness. Negative for dizziness, seizures, syncope, facial asymmetry, speech difficulty, light-headedness and headaches.   Hematological: Negative for adenopathy. Does not bruise/bleed easily.   Psychiatric/Behavioral: Positive for sleep disturbance. Negative for behavioral problems, confusion, decreased concentration, dysphoric mood, hallucinations and suicidal ideas. The patient is not hyperactive.         ---------------------------------------------------------------------------------------------------------------------   Past Medical History:   Diagnosis Date   • Arthritis    • Chronic headaches    • Depression    • Diabetes mellitus (CMS/HCC)    • Hypertension    • Menopause    • Multiple sclerosis (CMS/HCC)    • Stroke (CMS/HCC)     august this year-left side affected,speech slurrs when tired.     Past Surgical History:   Procedure Laterality Date   • CHOLECYSTECTOMY     • TONSILLECTOMY       Family History   Problem Relation Age of Onset   • Stroke Mother    • Diabetes Mother    • Cancer Father         Pancreatic CA     Social History     Socioeconomic History   • Marital status:      Spouse name: Not on file   • Number of children: Not on file   • Years of education: Not on file   • Highest education level: Not on file   Tobacco Use   • Smoking status: Never Smoker   Substance and Sexual Activity   • Alcohol use: No   Social History Narrative    Patient is .      ---------------------------------------------------------------------------------------------------------------------   Allergies:  Erythromycin and Tramadol  ---------------------------------------------------------------------------------------------------------------------   Prior to Admission Medications     Prescriptions Last Dose Informant Patient Reported? Taking?    amLODIPine (NORVASC) 5 MG tablet   Yes No    aspirin EC 81 MG EC tablet   No No    Take 1 tablet by mouth Daily.    atorvastatin (LIPITOR) 20 MG tablet   Yes No     cyanocobalamin 1000 MCG/ML injection  Pharmacy Yes No    Inject 1,000 mcg into the shoulder, thigh, or buttocks 1 (One) Time Per Week. tuesdays    gabapentin (NEURONTIN) 600 MG tablet   Yes No    Take 1,200 mg by mouth 3 (Three) Times a Day.    glyBURIDE-metFORMIN (GLUCOVANCE) 5-500 MG per tablet   Yes No    Take 1 tablet by mouth 3 (Three) Times a Day.    HYDROcodone-acetaminophen (NORCO)  MG per tablet  Pharmacy Yes No    Take 1 tablet by mouth 3 (Three) Times a Day.    insulin aspart (novoLOG) 100 UNIT/ML injection   No No    Inject 10 Units under the skin 3 (Three) Times a Day With Meals. Use for the sliding scale too.    insulin aspart (novoLOG) 100 UNIT/ML injection   No No    Inject 0-7 Units under the skin 4 (Four) Times a Day Before Meals & at Bedtime.    Insulin Glargine (BASAGLAR KWIKPEN) 100 UNIT/ML injection pen   No No    Inject 35 Units under the skin Daily.    LORazepam (ATIVAN) 0.5 MG tablet  Pharmacy Yes No    Take 0.5 mg by mouth Daily As Needed for Anxiety. 1-2 tablets daily prn    naproxen (NAPROSYN) 500 MG tablet   Yes No    Take 1,000 mg by mouth 2 (Two) Times a Day As Needed.    promethazine (PHENERGAN) 25 MG tablet   Yes No    tiZANidine (ZANAFLEX) 4 MG tablet   Yes No    Take 4 mg by mouth 3 (Three) Times a Day As Needed. Take 1 to 2 tablets by mouth 3 times a day.    Umeclidinium Bromide (INCRUSE ELLIPTA) 62.5 MCG/INH aerosol powder   Spouse/Significant Other Yes No    Inhale 1 puff Daily.        ---------------------------------------------------------------------------------------------------------------------   Vital Signs:  Temp:  [99.7 °F (37.6 °C)-103.1 °F (39.5 °C)] 100 °F (37.8 °C)  Heart Rate:  [104-112] 105  Resp:  [20-22] 22  BP: (108-150)/(61-72) 140/72    No data found.  SpO2:  [62 %-98 %] 98 %  on  Flow (L/min):  [3] 3;   Device (Oxygen Therapy): nasal cannula  Body mass index is 35.43 kg/m².    Wt Readings from Last 3 Encounters:   03/17/19 90.7 kg (200 lb)    12/06/17 88 kg (194 lb)   08/30/17 88.2 kg (194 lb 6.4 oz)               ---------------------------------------------------------------------------------------------------------------------   Physical Exam:  Constitutional: Obese, she is not in obvious respiratory distress, she is awake and alert, she has frequent cough, she looks older than stated age of 48, she looks chronically ill.      HENT:  Head: Normocephalic and atraumatic.  Mouth:  Moist mucous membranes.  No rhinorrhea, no tonsillopharyngeal congestion.  Pupils reactive to light no nystagmus  Eyes:  Conjunctivae and EOM are normal.  Pupils are equal, round, and reactive to light.  No scleral icterus.  Neck:  Neck supple.  No JVD present.  No bruit no lymphadenopathy  Cardiovascular:  Normal rate, regular rhythm and normal heart sounds with no murmur.  Pulmonary/Chest:  No respiratory distress, wheezing mostly post tussive, scattered rhonchi, crackles and rales noted on right lower lung fields mostly posteriorly along the posterior axillary line inspiratory, equal expansion no splinting.  No change in vocal and tactile fremitus.    Abdominal:  Soft.  Bowel sounds are normal.  No distension and no tenderness.  Obese, no guarding no rigidity, large right upper quadrant scar from previous cholecystectomy.  Musculoskeletal:  No edema, no tenderness, and no deformity.  No red or swollen joints anywhere.    Neurological:  Alert and oriented to person, place, and time.  No cranial nerve deficit.  No tongue deviation.  No facial droop.  No slurred speech.  She is somewhat hyperreflexic  Skin:  Skin is warm and dry.  No rash noted.  No pallor.   Peripheral vascular:  No edema and strong pulses on all 4 extremities.    ---------------------------------------------------------------------------------------------------------------------  EKG: EKG is pending  Telemetry: Sinus rhythm rate of 97 bpm  I have personally looked at both the EKG and the telemetry  strips.  --------------------------------------------------------------------------------------------------------------------    Results from last 7 days   Lab Units 03/17/19  1257   LACTATE mmol/L 4.0*   WBC 10*3/mm3 7.63   HEMOGLOBIN g/dL 7.3*   HEMATOCRIT % 27.2*   MCV fL 65.9*   MCHC g/dL 26.8*   PLATELETS 10*3/mm3 535*     Results from last 7 days   Lab Units 03/17/19  1257   SODIUM mmol/L 139   POTASSIUM mmol/L 4.7   CHLORIDE mmol/L 101   CO2 mmol/L 22.9   BUN mg/dL 16   CREATININE mg/dL 1.23*   EGFR IF NONAFRICN AM mL/min/1.73 47*   CALCIUM mg/dL 8.7   GLUCOSE mg/dL 276*   ALBUMIN g/dL 3.77   BILIRUBIN mg/dL 0.5   ALK PHOS U/L 190*   AST (SGOT) U/L 20   ALT (SGPT) U/L 14   Estimated Creatinine Clearance: 60.4 mL/min (A) (by C-G formula based on SCr of 1.23 mg/dL (H)).    No results found for: HGBA1C, POCGLU  No results found for: AMMONIA    Results from last 7 days   Lab Units 03/17/19  1515   NITRITE UA  Negative   WBC UA /HPF Too Numerous to Count*   BACTERIA UA /HPF 4+*   SQUAM EPITHEL UA /HPF 3-6*             pH pH, Arterial   Date Value Ref Range Status   03/17/2019 7.416 7.350 - 7.450 pH units Final      pO2 pO2, Arterial   Date Value Ref Range Status   03/17/2019 44.0 (C) 80.0 - 100.0 mm Hg Final      pCO2 pCO2, Arterial   Date Value Ref Range Status   03/17/2019 37.2 35.0 - 45.0 mm Hg Final      HCO3 HCO3, Arterial   Date Value Ref Range Status   03/17/2019 23.4 22.0 - 26.0 mmol/L Final        I have personally looked at the labs and they are summarized above.  ----------------------------------------------------------------------------------------------------------------------  Imaging Results (last 24 hours)     Procedure Component Value Units Date/Time    XR Chest AP [[id number]] Updated:  03/17/19 4177        I have personally reviewed the radiology images and read the final radiology  report.  ----------------------------------------------------------------------------------------------------------------------  Assessment and Plan:  -Suspect secondary to UTI and probable right lower lobe pneumonia  -Acute hypoxic respiratory failure  -UTI  -Acute kidney injury  -Lactic acidosis  -Dyslipidemia  -History of right middle cerebral artery stroke  -Neuromuscular disorder being work-up for possible multiple sclerosis, my review of labs and work-up for MS seems to be negative, she is to follow with Dr. Anthony Lopez/neurology in [facility] with spinal stenosis of C5-C6  -History of hypertension  -Microcytic anemia  -Diabetes type 2 insulin requiring with hyperglycemia  -Chronic pain syndrome  -Morbid obesity  -Essential hypertension  -Query obstructive sleep apnea    Cultures has been done, antibiotic has been started, DVT and GI prophylaxis, Accu-Chek and sliding scale, hydration, DuoNeb as needed, oxygen supplementation, fall precautions, gentle hydration.  Monitor electrolytes and renal function.  Hold off NSAIDs for now. Patient will need outpatient sleep study to rule out sleep apnea once stable and discharge.    Plan has been outlined to the patient together with her  and agreed further management may change as condition evolves.    Patient is high risk coming in with sepsis acute hypoxic respiratory failure and acute kidney injury, she has UTI and pneumonia she will require more than 2 nights of stay.    Amy Ross MD  03/17/19  3:40 PM    Electronically signed by Amy Ross MD at 3/17/2019  4:05 PM       ICU Vital Signs     Row Name 03/19/19 1840                   Vitals    Temp  98 °F (36.7 °C)        Temp src  Oral        Pulse  93        Heart Rate Source  Monitor        Resp  18        Resp Rate Source  Visual        BP  140/95        Noninvasive MAP (mmHg)  126        BP Location  Right arm        BP Method  Automatic        Patient Position  Lying            Oxygen Therapy    SpO2  93 %        Device (Oxygen Therapy)  room air            Lines, Drains & Airways    Active LDAs     Name:   Placement date:   Placement time:   Site:   Days:    Peripheral IV 03/19/19 0318 Left;Posterior Forearm   03/19/19 0318    Forearm   1                Hospital Medications (active)       Dose Frequency Start End    amLODIPine (NORVASC) tablet 5 mg (Discontinued) 5 mg Daily 3/18/2019 3/19/2019    Sig - Route: Take 1 tablet by mouth Daily. - Oral    Reason for Discontinue: Patient Discharge    Cosign for Ordering: Required by Amy Ross MD    aspirin EC tablet 81 mg (Discontinued) 81 mg Daily 3/18/2019 3/19/2019    Sig - Route: Take 1 tablet by mouth Daily. - Oral    Reason for Discontinue: Patient Discharge    Cosign for Ordering: Required by Amy Ross MD    atorvastatin (LIPITOR) tablet 20 mg (Discontinued) 20 mg Nightly 3/17/2019 3/19/2019    Sig - Route: Take 1 tablet by mouth Every Night. - Oral    Reason for Discontinue: Patient Discharge    Cosign for Ordering: Required by Amy Ross MD    carvedilol (COREG) tablet 18.75 mg (Discontinued) 18.75 mg 2 Times Daily With Meals 3/17/2019 3/19/2019    Sig - Route: Take 3 tablets by mouth 2 (Two) Times a Day With Meals. - Oral    Reason for Discontinue: Patient Discharge    Cosign for Ordering: Required by Amy Ross MD    cefdinir (OMNICEF) capsule 300 mg (Discontinued) 300 mg Every 12 Hours Scheduled 3/19/2019 3/19/2019    Sig - Route: Take 1 capsule by mouth Every 12 (Twelve) Hours. - Oral    Reason for Discontinue: Patient Discharge    Cosign for Ordering: Accepted by Nelson Min MD on 3/19/2019  3:29 PM    cetirizine (zyrTEC) tablet 10 mg (Discontinued) 10 mg Daily 3/18/2019 3/19/2019    Sig - Route: Take 1 tablet by mouth Daily. - Oral    Reason for Discontinue: Patient Discharge    Cosign for Ordering: Required by Amy Ross MD    desvenlafaxine (PRISTIQ) 24 hr tablet 50 mg  (Discontinued) 50 mg Daily 3/18/2019 3/19/2019    Sig - Route: Take 1 tablet by mouth Daily. - Oral    Reason for Discontinue: Patient Discharge    Cosign for Ordering: Required by Amy Ross MD    dextrose (D50W) 25 g/ 50mL Intravenous Solution 25 g (Discontinued) 25 g Every 15 Minutes PRN 3/17/2019 3/19/2019    Sig - Route: Infuse 50 mL into a venous catheter Every 15 (Fifteen) Minutes As Needed for Low Blood Sugar (Blood Sugar Less Than 70). - Intravenous    Reason for Discontinue: Patient Discharge    dextrose (GLUTOSE) oral gel 15 g (Discontinued) 15 g Every 15 Minutes PRN 3/17/2019 3/19/2019    Sig - Route: Take 15 g by mouth Every 15 (Fifteen) Minutes As Needed for Low Blood Sugar (Blood sugar less than 70). - Oral    Reason for Discontinue: Patient Discharge    ferrous gluconate tablet 324 mg (Discontinued) 324 mg Daily With Breakfast 3/19/2019 3/19/2019    Sig - Route: Take 1 tablet by mouth Daily With Breakfast. - Oral    Reason for Discontinue: Patient Discharge    Cosign for Ordering: Accepted by Nelson Min MD on 3/19/2019  3:29 PM    gabapentin (NEURONTIN) capsule 400 mg (Discontinued) 400 mg 3 Times Daily 3/18/2019 3/19/2019    Sig - Route: Take 1 capsule by mouth 3 (Three) Times a Day. - Oral    Reason for Discontinue: Patient Discharge    glucagon (human recombinant) (GLUCAGEN DIAGNOSTIC) injection 1 mg (Discontinued) 1 mg As Needed 3/17/2019 3/19/2019    Sig - Route: Inject 1 mg under the skin into the appropriate area as directed As Needed (Blood Glucose Less Than 70). - Subcutaneous    Reason for Discontinue: Patient Discharge    heparin (porcine) 5000 UNIT/ML injection 5,000 Units (Discontinued) 5,000 Units Every 12 Hours Scheduled 3/17/2019 3/19/2019    Sig - Route: Inject 1 mL under the skin into the appropriate area as directed Every 12 (Twelve) Hours. - Subcutaneous    Reason for Discontinue: Patient Discharge    HYDROcodone-acetaminophen (NORCO)  MG per tablet 1 tablet  (Discontinued) 1 tablet 3 Times Daily 3/17/2019 3/19/2019    Sig - Route: Take 1 tablet by mouth 3 (Three) Times a Day. - Oral    Reason for Discontinue: Patient Discharge    insulin aspart (novoLOG) injection 0-7 Units (Discontinued) 0-7 Units 4 Times Daily Before Meals & Nightly 3/17/2019 3/19/2019    Sig - Route: Inject 0-7 Units under the skin into the appropriate area as directed 4 (Four) Times a Day Before Meals & at Bedtime. - Subcutaneous    Reason for Discontinue: Patient Discharge    insulin aspart (novoLOG) injection 0-7 Units (Discontinued) 0-7 Units Once 3/17/2019 3/19/2019    Sig - Route: Inject 0-7 Units under the skin into the appropriate area as directed 1 (One) Time. - Subcutaneous    Reason for Discontinue: Patient Discharge    insulin detemir (LEVEMIR) injection 45 Units (Discontinued) 45 Units Daily at 1100 3/19/2019 3/19/2019    Sig - Route: Inject 45 Units under the skin into the appropriate area as directed Daily. - Subcutaneous    Reason for Discontinue: Patient Discharge    ipratropium-albuterol (DUO-NEB) nebulizer solution 3 mL (Discontinued) 3 mL Every 6 Hours PRN 3/18/2019 3/19/2019    Sig - Route: Take 3 mL by nebulization Every 6 (Six) Hours As Needed for Wheezing or Shortness of Air. - Nebulization    Reason for Discontinue: Patient Discharge    Cosign for Ordering: Accepted by Nelson Min MD on 3/18/2019  3:19 PM    lactobacillus acidophilus (RISAQUAD) capsule 1 capsule (Discontinued) 1 capsule Daily 3/18/2019 3/19/2019    Sig - Route: Take 1 capsule by mouth Daily. - Oral    Reason for Discontinue: Patient Discharge    LORazepam (ATIVAN) tablet 0.5 mg (Discontinued) 0.5 mg Nightly 3/17/2019 3/19/2019    Sig - Route: Take 1 tablet by mouth Every Night. - Oral    Reason for Discontinue: Patient Discharge    promethazine (PHENERGAN) 6.25 mg in sodium chloride 0.9 % 50 mL (Discontinued) 6.25 mg Every 6 Hours PRN 3/19/2019 3/19/2019    Sig - Route: Infuse 6.25 mg into a venous  "catheter Every 6 (Six) Hours As Needed for Nausea or Vomiting. - Intravenous    Reason for Discontinue: Patient Discharge    Cosign for Ordering: Accepted by Nelson Min MD on 3/19/2019  3:29 PM    Linked Group 1:  \"Or\" Linked Group Details        promethazine (PHENERGAN) tablet 12.5 mg (Discontinued) 12.5 mg Every 6 Hours PRN 3/19/2019 3/19/2019    Sig - Route: Take 1 tablet by mouth Every 6 (Six) Hours As Needed for Nausea or Vomiting. - Oral    Reason for Discontinue: Patient Discharge    Cosign for Ordering: Accepted by Nelson Min MD on 3/19/2019  3:29 PM    Linked Group 1:  \"Or\" Linked Group Details        sennosides-docusate sodium (SENOKOT-S) 8.6-50 MG tablet 2 tablet (Discontinued) 2 tablet Nightly 3/17/2019 3/19/2019    Sig - Route: Take 2 tablets by mouth Every Night. - Oral    Reason for Discontinue: Patient Discharge    sodium chloride 0.9 % flush 10 mL (Discontinued) 10 mL As Needed 3/17/2019 3/19/2019    Sig - Route: Infuse 10 mL into a venous catheter As Needed for Line Care. - Intravenous    Reason for Discontinue: Patient Discharge    Cosign for Ordering: Accepted by Gadiel Villagomez MD on 3/17/2019  1:11 PM    sodium chloride 0.9 % flush 3 mL (Discontinued) 3 mL Every 12 Hours Scheduled 3/17/2019 3/19/2019    Sig - Route: Infuse 3 mL into a venous catheter Every 12 (Twelve) Hours. - Intravenous    Reason for Discontinue: Patient Discharge    sodium chloride 0.9 % flush 3-10 mL (Discontinued) 3-10 mL As Needed 3/17/2019 3/19/2019    Sig - Route: Infuse 3-10 mL into a venous catheter As Needed for Line Care. - Intravenous    Reason for Discontinue: Patient Discharge    tiZANidine (ZANAFLEX) tablet 8 mg (Discontinued) 8 mg 3 Times Daily PRN 3/17/2019 3/19/2019    Sig - Route: Take 2 tablets by mouth 3 (Three) Times a Day As Needed for Muscle Spasms. - Oral    Reason for Discontinue: Patient Discharge    Cosign for Ordering: Required by Amy Ross MD            Lab Results (last 24 " hours)     Procedure Component Value Units Date/Time    Blood Culture - Blood, Wrist, Right [[id number]] Collected:  03/17/19 1754    Specimen:  Blood from Wrist, Right Updated:  03/19/19 1830     Blood Culture No growth at 2 days    Hemoglobin & Hematocrit, Blood [[id number]]  (Abnormal) Collected:  03/19/19 1604    Specimen:  Blood Updated:  03/19/19 1718     Hemoglobin 8.0 g/dL      Hematocrit 29.5 %     Narrative:       Post Transfusion Specimen    POC Glucose Once [[id number]]  (Abnormal) Collected:  03/19/19 1609    Specimen:  Blood Updated:  03/19/19 1627     Glucose 268 mg/dL     POC Glucose Once [[id number]]  (Abnormal) Collected:  03/19/19 1117    Specimen:  Blood Updated:  03/19/19 1131     Glucose 295 mg/dL     Folate [[id number]]  (Normal) Collected:  03/19/19 0407    Specimen:  Blood Updated:  03/19/19 1051     Folate 6.67 ng/mL     Vitamin B12 [[id number]]  (Normal) Collected:  03/19/19 0407    Specimen:  Blood Updated:  03/19/19 1051     Vitamin B-12 301 pg/mL     POC Glucose Once [[id number]]  (Abnormal) Collected:  03/19/19 0722    Specimen:  Blood Updated:  03/19/19 0730     Glucose 145 mg/dL     Urine Culture - Urine, Urine, Clean Catch [[id number]]  (Abnormal)  (Susceptibility) Collected:  03/17/19 1515    Specimen:  Urine, Clean Catch Updated:  03/19/19 0719     Urine Culture >100,000 CFU/mL Escherichia coli    Susceptibility      Escherichia coli     BRIONNA     Ampicillin Resistant     Ampicillin + Sulbactam Intermediate     Cefazolin Susceptible     Cefepime Susceptible     Ceftazidime Susceptible     Ceftriaxone Susceptible     Gentamicin Susceptible     Levofloxacin Susceptible     Nitrofurantoin Susceptible     Piperacillin + Tazobactam Susceptible     Tetracycline Susceptible     Trimethoprim + Sulfamethoxazole Resistant                    Comprehensive Metabolic Panel [[id number]]  (Abnormal) Collected:  03/19/19 0407    Specimen:  Blood Updated:  03/19/19 0513     Glucose 155 mg/dL      BUN 13  mg/dL      Creatinine 1.22 mg/dL      Sodium 138 mmol/L      Potassium 3.9 mmol/L      Chloride 104 mmol/L      CO2 22.0 mmol/L      Calcium 8.4 mg/dL      Total Protein 7.1 g/dL      Albumin 2.91 g/dL      ALT (SGPT) 11 U/L      AST (SGOT) 16 U/L      Alkaline Phosphatase 99 U/L      Total Bilirubin 0.2 mg/dL      eGFR Non African Amer 47 mL/min/1.73      Globulin 4.2 gm/dL      A/G Ratio 0.7 g/dL      BUN/Creatinine Ratio 10.7     Anion Gap 12.0 mmol/L     Narrative:       GFR Normal >60  Chronic Kidney Disease <60  Kidney Failure <15    C-reactive Protein [[id number]]  (Abnormal) Collected:  03/19/19 0407    Specimen:  Blood Updated:  03/19/19 0513     C-Reactive Protein 14.51 mg/dL     Iron Profile [[id number]]  (Abnormal) Collected:  03/19/19 0407    Specimen:  Blood Updated:  03/19/19 0513     Iron 13 mcg/dL      Iron Saturation 3 %      Transferrin 258 mg/dL      TIBC 384 mcg/dL     CBC & Differential [[id number]] Collected:  03/19/19 0407    Specimen:  Blood Updated:  03/19/19 0506    Narrative:       The following orders were created for panel order CBC & Differential.  Procedure                               Abnormality         Status                     ---------                               -----------         ------                     Scan Slide[[id number]]                                       Final result               CBC Auto Differential[[id number]]        Abnormal            Final result                 Please view results for these tests on the individual orders.    CBC Auto Differential [[id number]]  (Abnormal) Collected:  03/19/19 0407    Specimen:  Blood Updated:  03/19/19 0506     WBC 9.83 10*3/mm3      RBC 3.74 10*6/mm3      Hemoglobin 6.7 g/dL      Hematocrit 24.8 %      MCV 66.3 fL      MCH 17.9 pg      MCHC 27.0 g/dL      RDW 19.9 %      RDW-SD 48.9 fl      MPV 8.4 fL      Platelets 428 10*3/mm3      Neutrophil % 66.2 %      Lymphocyte % 20.7 %      Monocyte % 8.5 %      Eosinophil % 4.0 %       Basophil % 0.2 %      Immature Grans % 0.4 %      Neutrophils, Absolute 6.51 10*3/mm3      Lymphocytes, Absolute 2.03 10*3/mm3      Monocytes, Absolute 0.84 10*3/mm3      Eosinophils, Absolute 0.39 10*3/mm3      Basophils, Absolute 0.02 10*3/mm3      Immature Grans, Absolute 0.04 10*3/mm3     Scan Slide [[id number]] Collected:  03/19/19 0407    Specimen:  Blood Updated:  03/19/19 0506     Anisocytosis Mod/2+     Hypochromia Mod/2+     Microcytes Large/3+     Ovalocytes Slight/1+     Platelet Morphology Normal    POC Glucose Once [[id number]]  (Abnormal) Collected:  03/18/19 1934    Specimen:  Blood Updated:  03/18/19 1940     Glucose 253 mg/dL         Orders (last 24 hrs)     Start     Ordered    03/20/19 0600  Phosphorus  Morning Draw,   Status:  Canceled      03/19/19 1109    03/20/19 0000  lactobacillus acidophilus (RISAQUAD) capsule capsule  Daily      03/19/19 1730    03/20/19 0000  ferrous gluconate (FERGON) 324 MG tablet  Daily With Breakfast      03/19/19 1730    03/19/19 1726  Discontinue IV  Once,   Status:  Canceled      03/19/19 1730    03/19/19 1722  Discharge patient  Once      03/19/19 1730    03/19/19 1200  ferrous gluconate tablet 324 mg  Daily With Breakfast,   Status:  Discontinued      03/19/19 1010    03/19/19 1100  insulin detemir (LEVEMIR) injection 45 Units  Daily at 1100,   Status:  Discontinued      03/18/19 1605    03/19/19 1100  cefdinir (OMNICEF) capsule 300 mg  Every 12 Hours Scheduled,   Status:  Discontinued      03/19/19 0905    03/19/19 0902  promethazine (PHENERGAN) tablet 12.5 mg  Every 6 Hours PRN,   Status:  Discontinued      03/19/19 0903    03/19/19 0902  promethazine (PHENERGAN) 6.25 mg in sodium chloride 0.9 % 50 mL  Every 6 Hours PRN,   Status:  Discontinued      03/19/19 0903    03/19/19 0000  cefdinir (OMNICEF) 300 MG capsule  Every 12 Hours Scheduled      03/19/19 1730    03/19/19 0000  Discharge Follow-up with PCP      03/19/19 1730    03/19/19 0000  Referral to Home  Health     Comments:  Obtain CBC&BMP on Friday to sent to PCP Gardenia Weiss.    03/19/19 1731    03/18/19 0915  lactobacillus acidophilus (RISAQUAD) capsule 1 capsule  Daily,   Status:  Discontinued      03/18/19 0828    03/18/19 0900  amLODIPine (NORVASC) tablet 5 mg  Daily,   Status:  Discontinued      03/17/19 2030 03/18/19 0900  aspirin EC tablet 81 mg  Daily,   Status:  Discontinued      03/17/19 2030 03/18/19 0900  desvenlafaxine (PRISTIQ) 24 hr tablet 50 mg  Daily,   Status:  Discontinued      03/17/19 2030 03/18/19 0900  cetirizine (zyrTEC) tablet 10 mg  Daily,   Status:  Discontinued      03/17/19 2030 03/18/19 0800  ipratropium-albuterol (DUO-NEB) nebulizer solution 3 mL  Every 6 Hours PRN,   Status:  Discontinued      03/18/19 0749    03/18/19 0000  gabapentin (NEURONTIN) capsule 400 mg  3 Times Daily,   Status:  Discontinued      03/17/19 2307 03/17/19 2200  POC Glucose 4x Daily AC & at Bedtime  4 Times Daily Before Meals & at Bedtime,   Status:  Canceled      03/17/19 1707 03/17/19 2130  atorvastatin (LIPITOR) tablet 20 mg  Nightly,   Status:  Discontinued      03/17/19 2030 03/17/19 2130  carvedilol (COREG) tablet 18.75 mg  2 Times Daily With Meals,   Status:  Discontinued      03/17/19 2030 03/17/19 2130  HYDROcodone-acetaminophen (NORCO)  MG per tablet 1 tablet  3 Times Daily,   Status:  Discontinued      03/17/19 2033 03/17/19 2130  LORazepam (ATIVAN) tablet 0.5 mg  Nightly,   Status:  Discontinued      03/17/19 2033 03/17/19 2100  sodium chloride 0.9 % flush 3 mL  Every 12 Hours Scheduled,   Status:  Discontinued      03/17/19 1707 03/17/19 2100  heparin (porcine) 5000 UNIT/ML injection 5,000 Units  Every 12 Hours Scheduled,   Status:  Discontinued      03/17/19 1707 03/17/19 2100  sennosides-docusate sodium (SENOKOT-S) 8.6-50 MG tablet 2 tablet  Nightly,   Status:  Discontinued      03/17/19 1707    03/17/19 2100  insulin aspart (novoLOG) injection 0-7 Units   4 Times Daily Before Meals & Nightly,   Status:  Discontinued      03/17/19 1707 03/17/19 2027  tiZANidine (ZANAFLEX) tablet 8 mg  3 Times Daily PRN,   Status:  Discontinued      03/17/19 2030 03/17/19 1845  insulin aspart (novoLOG) injection 0-7 Units  Once,   Status:  Discontinued      03/17/19 1745 03/17/19 1707  sodium chloride 0.9 % flush 3-10 mL  As Needed,   Status:  Discontinued      03/17/19 1707 03/17/19 1707  dextrose (GLUTOSE) oral gel 15 g  Every 15 Minutes PRN,   Status:  Discontinued      03/17/19 1707 03/17/19 1707  dextrose (D50W) 25 g/ 50mL Intravenous Solution 25 g  Every 15 Minutes PRN,   Status:  Discontinued      03/17/19 1707 03/17/19 1707  glucagon (human recombinant) (GLUCAGEN DIAGNOSTIC) injection 1 mg  As Needed,   Status:  Discontinued      03/17/19 1707 03/17/19 1239  Oxygen Therapy- Nasal Cannula; 2 LPM; Titrate for SPO2: 92%, Greater Than or Equal To  Continuous PRN,   Status:  Canceled      03/17/19 1240    03/17/19 1239  sodium chloride 0.9 % flush 10 mL  As Needed,   Status:  Discontinued      03/17/19 1240    --  insulin detemir (LEVEMIR) 100 UNIT/ML injection  Daily at 1100      03/17/19 1720    --  desvenlafaxine (PRISTIQ) 50 MG 24 hr tablet  Daily      03/17/19 1720    --  carvedilol (COREG) 12.5 MG tablet  2 Times Daily With Meals      03/17/19 1720    --  levocetirizine (XYZAL) 5 MG tablet  Every Evening      03/17/19 1720          Operative/Procedure Notes (last 24 hours) (Notes from 3/19/2019  4:51 PM through 3/20/2019  4:51 PM)     No notes of this type exist for this encounter.        Physician Progress Notes (last 24 hours) (Notes from 3/19/2019  4:51 PM through 3/20/2019  4:51 PM)     No notes of this type exist for this encounter.        Consult Notes (last 24 hours) (Notes from 3/19/2019  4:51 PM through 3/20/2019  4:51 PM)     No notes of this type exist for this encounter.        Physical Therapy Notes (last 24 hours) (Notes from 3/19/2019  4:52  PM through 3/20/2019  4:52 PM)     No notes of this type exist for this encounter.        Occupational Therapy Notes (last 24 hours) (Notes from 3/19/2019  4:52 PM through 3/20/2019  4:52 PM)     No notes of this type exist for this encounter.             Discharge Summary      Lana Lamar PA-C at 3/19/2019  5:32 PM              [facility] Medicine Services  DISCHARGE SUMMARY    Date of Admission: 3/17/2019    Date of Discharge:  3/19/2019    PCP: Gardenia Weiss APRN    Discharging Provider: Lana Lamar PA-C     Admission Diagnosis:   Please see admission H&P    Discharge Diagnosis:   · Severe sepsis, present on admission, secondary to UTI  · Urination urinary tract infection with growth E. coli  · Hyperglycemia in the setting of diabetes mellitus type 2  · Acute kidney injury versus underlying chronic kidney disease  · Microcytic anemia with severe iron deficiency  · Thrombocytosis, resolved  · Chronic pain  · Morbid obesity with BMI of 42.02 kg/m²  · History of right MCA CVA  · History of possible neuromuscular disorder with cerebellar atrophy  · Iron deficiency     Procedures Performed:  -1 unit of PRBCs on 3/19/2019     HPI     History of Present Illness:  Yissel Lopez is a 49 y.o. female with past medical history significant for chronic pain syndrome, diabetes mellitus type 2, obesity and with possible underlying neuromuscular disease.  She presented to the emergency room on March 17, 2019 with reports of fever and cough in addition to wheezing and body aches.  She was found to have initial temperature of 103.1 °F, pulse 112, respiration rate of 22 and a pressure of 108/72.  Urinalysis revealed concern for urinary tract infection in addition there was some concern regarding possible pneumonia.      Hospital Course     Hospital Course  Yissel Lopez was admitted as outlined in above HPI.  She was initially started on IV Rocephin and IV doxycycline given concern for  possible right lower lobe pneumonia in addition to urinary tract infection.  On March 2019 2013 repeat 2 view chest x-ray did not demonstrate acute infiltration.  IV doxycycline was discontinued with negative atypical testing.  Lactic acid did normalize during hospitalization.  IV Rocephin was continued.  Given hemoglobin of 7.3 on 317 and 318 with known history of iron deficiency.  Iron studies were done and she was found to have severe iron deficiency.    Mrs. Lopez is bedbound at baseline and her  is her primary caretaker.  She has had previous neurological workup of multiple sclerosis in the past.    On March 19, 2019, urine culture to finalize with E. coli susceptible to cephalosporins.  However, initial hemoglobin this morning was 6.7 down from 7.3 on prior days in setting of likely dilution.  She did receive 1 unit of packed red blood cells with repeat hemoglobin checked on this date with improvement to 8.  Repeat hemoglobin was ordered for Friday, March 22, 2019.  It was recommended that she have outpatient endoscopies upon discharge at the discretion of her primary care provider.  She was also started on oral iron therapies.    Discussed with AM CHRIS Guerrero on day of discharge.     Telemetry on day of discharge SR in the 90s.     Her room air oxygen saturation on the day of discharge was 92-97%.    Given borderline creatinine during admission, oral diabetic medications and naproxen were held upon discharge.  He was also ordered to be obtained  Upon discharge, given worsening and ongoing debility with bedbound status and multiple medical comorbidities home health was ordered for medication management and to help obtain labs.    Upon return of urine culture, IV Rocephin was transitioned to oral omnicef.     Pertinent Laboratory and Radiology Results     Pertinent Test Results:      Results from last 7 days   Lab Units 03/17/19  1304   PH, ARTERIAL pH units 7.416   PO2 ART mm Hg 44.0*   PCO2, ARTERIAL mm Hg  37.2   HCO3 ART mmol/L 23.4     Results from last 7 days   Lab Units 03/19/19  1604 03/19/19  0407 03/18/19  0028 03/17/19  1257   WBC 10*3/mm3  --  9.83 13.54* 7.63   HEMOGLOBIN g/dL 8.0* 6.7* 7.3* 7.3*   HEMATOCRIT % 29.5* 24.8* 27.1* 27.2*   PLATELETS 10*3/mm3  --  428 541* 535*   MCV fL  --  66.3* 66.1* 65.9*   SODIUM mmol/L  --  138 139 139   POTASSIUM mmol/L  --  3.9 4.2 4.7   CHLORIDE mmol/L  --  104 103 101   CO2 mmol/L  --  22.0 23.0 22.9   BUN mg/dL  --  13 14 16   CREATININE mg/dL  --  1.22* 1.14* 1.23*   GLUCOSE mg/dL  --  155* 225* 276*   CALCIUM mg/dL  --  8.4* 8.7 8.7          Results from last 7 days   Lab Units 03/19/19  0407 03/18/19  1136 03/18/19  0539 03/18/19  0028 03/17/19  1700 03/17/19  1257   CRP mg/dL 14.51*  --   --  21.15*  --   --    LACTATE mmol/L  --  1.3 1.2 1.1 2.3* 4.0*   WBC 10*3/mm3 9.83  --   --  13.54*  --  7.63     Results from last 7 days   Lab Units 03/19/19  0407 03/17/19  1257   BILIRUBIN mg/dL 0.2 0.5   ALK PHOS U/L 99 190*   ALT (SGPT) U/L 11 14   AST (SGOT) U/L 16 20           Invalid input(s): TG, LDLCALC, LDLREALC  Results from last 7 days   Lab Units 03/17/19  1754   HEMOGLOBIN A1C % 7.70*     Brief Urine Lab Results  (Last result in the past 365 days)      Color   Clarity   Blood   Leuk Est   Nitrite   Protein   CREAT   Urine HCG        03/17/19 1515 Yellow Cloudy Small (1+) Moderate (2+) Negative 30 mg/dL (1+)                        Results for orders placed during the hospital encounter of 08/25/17   Adult Transthoracic Echo Complete    Narrative · Normal left ventricular cavity size and wall thickness noted. All left   ventricular wall segments contract normally.  · Estimated EF appears to be in the range of 61 - 65%.  · The aortic valve is structurally normal. No aortic valve regurgitation   is present. No aortic valve stenosis is present.  · The mitral valve is normal in structure. No mitral valve regurgitation   is present. No significant mitral valve stenosis  is present.  · The tricuspid valve is normal. No tricuspid valve stenosis is present.   No tricuspid valve regurgitation is present.  · There is no evidence of pericardial effusion.           Order Current Status    Blood Culture - Blood, Arm, Left Preliminary result    Blood Culture - Blood, Arm, Left Preliminary result    Blood Culture - Blood, Arm, Right Preliminary result    Blood Culture - Blood, Wrist, Right Preliminary result            ----------------------------------------------------------------------------------------------------------------------  Xr Chest Ap    Result Date: 3/17/2019  No evidence of active or acute cardiopulmonary disease on today's chest radiograph.     This report was finalized on 3/17/2019 8:00 PM by Dr. Loy Lerner MD.      Xr Chest Pa & Lateral    Result Date: 3/18/2019  No radiographic evidence of acute cardiac or pulmonary disease.  This report was finalized on 3/18/2019 3:17 PM by Dr. Loy Lerner MD.          Microbiology Results (last 10 days)     Procedure Component Value - Date/Time    Blood Culture - Blood, Arm, Left [[id number]] Collected:  03/17/19 2031    Lab Status:  Preliminary result Specimen:  Blood from Arm, Left Updated:  03/18/19 2045     Blood Culture No growth at 24 hours    Blood Culture - Blood, Wrist, Right [[id number]] Collected:  03/17/19 1754    Lab Status:  Preliminary result Specimen:  Blood from Wrist, Right Updated:  03/19/19 1830     Blood Culture No growth at 2 days    Urine Culture - Urine, Urine, Clean Catch [[id number]]  (Abnormal)  (Susceptibility) Collected:  03/17/19 1515    Lab Status:  Final result Specimen:  Urine, Clean Catch Updated:  03/19/19 0719     Urine Culture >100,000 CFU/mL Escherichia coli    Susceptibility      Escherichia coli     BRIONNA     Ampicillin Resistant     Ampicillin + Sulbactam Intermediate     Cefazolin Susceptible     Cefepime Susceptible     Ceftazidime Susceptible     Ceftriaxone Susceptible     Gentamicin  Susceptible     Levofloxacin Susceptible     Nitrofurantoin Susceptible     Piperacillin + Tazobactam Susceptible     Tetracycline Susceptible     Trimethoprim + Sulfamethoxazole Resistant                    Legionella Antigen, Urine - Urine, Urine, Clean Catch [[id number]]  (Normal) Collected:  03/17/19 1515    Lab Status:  Final result Specimen:  Urine, Clean Catch Updated:  03/17/19 1855     LEGIONELLA ANTIGEN, URINE Negative    Narrative:       Presumptive negative for L. pneumophilia serogroup 1 antigen, suggesting no recent or current infection.    Blood Culture - Blood, Arm, Left [[id number]] Collected:  03/17/19 1258    Lab Status:  Preliminary result Specimen:  Blood from Arm, Left Updated:  03/19/19 1315     Blood Culture No growth at 2 days    Influenza Antigen, Rapid - Swab, Nasopharynx [[id number]]  (Normal) Collected:  03/17/19 1258    Lab Status:  Final result Specimen:  Swab from Nasopharynx Updated:  03/17/19 1333     Influenza A Ag, EIA Negative     Influenza B Ag, EIA Negative    Blood Culture - Blood, Arm, Right [[id number]] Collected:  03/17/19 1257    Lab Status:  Preliminary result Specimen:  Blood from Arm, Right Updated:  03/19/19 1315     Blood Culture No growth at 2 days            Discharge Vitals and Physical Examination       Vital Signs  Temp:  [98 °F (36.7 °C)-99.4 °F (37.4 °C)] 98 °F (36.7 °C)  Heart Rate:  [79-96] 93  Resp:  [18] 18  BP: ()/(47-95) 140/95     Physical Exam:  General:    Awake, alert, in no acute distress   Heart:      Normal S1 and S2. Regular rate and rhythm. No significant murmur, rubs or gallops appreciated.   Lungs:     Respirations regular, even and unlabored. Lungs clear to auscultation B/L. No wheezes, rales or rhonchi.   Abdomen:   Soft and nontender. No guarding, rebound tenderness or  organomegaly noted. Bowel sounds present x 4.   Extremities:  No clubbing, cyanosis or edema noted. Moves UE and LE equally B/L.         Discharge Disposition, Discharge  Medications, and Discharge Appointments     Discharge Disposition:   home    Condition on Discharge:  Fair    Discharge Medications:     Discharge Medications      New Medications      Instructions Start Date   cefdinir 300 MG capsule  Commonly known as:  OMNICEF   300 mg, Oral, Every 12 Hours Scheduled      ferrous gluconate 324 MG tablet  Commonly known as:  FERGON   324 mg, Oral, Daily With Breakfast      PROBIOTIC ADVANCED capsule   1 capsule, Oral, Daily         Changes to Medications      Instructions Start Date   insulin aspart 100 UNIT/ML injection  Commonly known as:  novoLOG  What changed:  how much to take   0-7 Units, Subcutaneous, 4 Times Daily Before Meals & Nightly         Continue These Medications      Instructions Start Date   amLODIPine 5 MG tablet  Commonly known as:  NORVASC   5 mg, Oral, Daily      aspirin 81 MG EC tablet   81 mg, Oral, Daily      atorvastatin 20 MG tablet  Commonly known as:  LIPITOR   20 mg, Oral, Nightly      carvedilol 12.5 MG tablet  Commonly known as:  COREG   18.75 mg, Oral, 2 Times Daily With Meals      desvenlafaxine 50 MG 24 hr tablet  Commonly known as:  PRISTIQ   50 mg, Oral, Daily      gabapentin 600 MG tablet  Commonly known as:  NEURONTIN   1,200 mg, Oral, 3 Times Daily      HYDROcodone-acetaminophen  MG per tablet  Commonly known as:  NORCO   1 tablet, Oral, 3 Times Daily      insulin detemir 100 UNIT/ML injection  Commonly known as:  LEVEMIR   55 Units, Subcutaneous, Daily at 1100      levocetirizine 5 MG tablet  Commonly known as:  XYZAL   5 mg, Oral, Every Evening      LORazepam 0.5 MG tablet  Commonly known as:  ATIVAN   0.5 mg, Oral, Nightly      tiZANidine 4 MG tablet  Commonly known as:  ZANAFLEX   8 mg, Oral, 3 Times Daily PRN         Stop These Medications    glyBURIDE-metFORMIN 5-500 MG per tablet  Commonly known as:  GLUCOVANCE     naproxen 500 MG tablet  Commonly known as:  NAPROSYN            Discharged medication regimen discussed with  attending physician prior to discharge.     Discharge Diet:   diabetic diet  Dietary Orders (From admission, onward)    Start     Ordered    03/17/19 1708  Diet Regular; Consistent Carbohydrate  Diet Effective Now     Question Answer Comment   Diet Texture / Consistency Regular    Common Modifiers Consistent Carbohydrate        03/17/19 1707          Activity at Discharge:  activity as tolerated         Discharge Disposition:    Home or Self Care        Follow-up Appointments:      Additional Instructions for the Follow-ups that You Need to Schedule     Discharge Follow-up with PCP   As directed       Currently Documented PCP:    Moi Lazaro APRN    PCP Phone Number:    [phone]     Follow Up Details:  one week follow-up with hospitalization for UTI         Referral to Home Health   As directed      Obtain CBC&BMP on Friday to sent to PCP Mio Lazaro.    Order Comments:  Obtain CBC&BMP on Friday to sent to PCP Moi Lazaro.     Face to Face Visit Date:  3/19/2019    Follow-up Provider for Plan of Care?:  I will be treating the patient on an ongoing basis.  Please send me the Plan of Care for signature.    Follow-up Provider:  MOI LAZARO [[id number]]    Reason/Clinical Findings:  ongoing debility with underyling neuromuscular disorder    Describe mobility limitations that make leaving home difficult:  Bedbound with multiple medical comorbidities    Nursing/Therapeutic Services Requested:  Skilled Nursing    Skilled nursing orders:  Medication education    Frequency:  1 Week 1         Basic Metabolic Panel    Mar 22, 2019 (Approximate)      To be performed by home health.  TO be sent to Moi Lazaro NP    Order Comments:  To be performed by home health.  TO be sent to Moi Lazaro NP          CBC & Differential    Mar 22, 2019 (Approximate)      To be performed by home health.  Send results to PCP Moi Lazaro    Order Comments:  To be performed by home health.  Send results to RANDI Lazaro     Manual Differential:  No            Follow-up Information     Moi Lazaro APRN Follow up in 1 week(s).    Specialty:  Family Medicine  Why:  MAR 26 @11:30AM  Contact information:  [address]    [facility] [address]  [phone]                   Additional Instructions for the Follow-ups that You Need to Schedule     Discharge Follow-up with PCP   As directed       Currently Documented PCP:    Moi Lazaro APRN    PCP Phone Number:    [phone]     Follow Up Details:  one week follow-up with hospitalization for UTI         Referral to Home Health   As directed      Obtain CBC&BMP on Friday to sent to PCP Moi Lazaro.    Order Comments:  Obtain CBC&BMP on Friday to sent to PCP Moi Lazaro.     Face to Face Visit Date:  3/19/2019    Follow-up Provider for Plan of Care?:  I will be treating the patient on an ongoing basis.  Please send me the Plan of Care for signature.    Follow-up Provider:  MOI LAZARO [[id number]]    Reason/Clinical Findings:  ongoing debility with underyling neuromuscular disorder    Describe mobility limitations that make leaving home difficult:  Bedbound with multiple medical comorbidities    Nursing/Therapeutic Services Requested:  Skilled Nursing    Skilled nursing orders:  Medication education    Frequency:  1 Week 1         Basic Metabolic Panel    Mar 22, 2019 (Approximate)      To be performed by home health.  TO be sent to Moi Lazaro NP    Order Comments:  To be performed by home health.  TO be sent to Moi Lazaro NP          CBC & Differential    Mar 22, 2019 (Approximate)      To be performed by home health.  Send results to RANDI Lazaro    Order Comments:  To be performed by home health.  Send results to PCP Moi Lazaro     Manual Differential:  No               Test Results Pending at Discharge:     Order Current Status    Blood Culture - Blood, Arm, Left Preliminary result    Blood Culture - Blood, Arm, Left Preliminary result    Blood Culture - Blood, Arm, Right Preliminary result    Blood  Culture - Blood, Wrist, Right Preliminary result             Lana Lamar PA-C  Hospital Medicine Team  03/19/19  7:04 PM      Time: Greater than 30 minutes spent on this discharge.              Electronically signed by Lana Lamar PA-C at 3/19/2019  7:04 PM       Discharge Order (From admission, onward)    Start     Ordered    03/19/19 1722  Discharge patient  Once     Expected Discharge Date:  03/19/19    Discharge Disposition:  Home or Self Care    Physician of Record for Attribution - Please select from Treatment Team:  AMY MORALES [[id number]]    Review needed by CMO to determine Physician of Record:  No       Question Answer Comment   Physician of Record for Attribution - Please select from Treatment Team AMY MORALES    Review needed by CMO to determine Physician of Record No        03/19/19 6532

## 2019-03-21 NOTE — PROGRESS NOTES
Discharge Planning Assessment   Jake     Patient Name: Yissel Lopez  MRN: [id number]  Today's Date: 3/21/2019    Admit Date: 3/17/2019      Discharge Plan     Row Name 03/21/19 1230       Plan    Final Discharge Disposition Code  06 - home with home health care    Final Note  Pt discharged home after  hours.  SS noted home health referral ordered per Physician at discharge.  SS spoke with Lead RN who stated no knowledge of home health being arranged.  SS attempted to contact pt and family with contact numbers available with no success.  SS made referral to [facility] per Shelia due to rotation list.  [facility] unable to contact pt and family.  Per Shelia [facility] will continue attempts to contact pt and family. Supervisor Shelia Plunkett aware.             Marta Hanson

## 2019-03-22 ENCOUNTER — LAB REQUISITION (OUTPATIENT)
Dept: LAB | Facility: HOSPITAL | Age: 49
End: 2019-03-22

## 2019-03-22 DIAGNOSIS — A41.9 SEPSIS (HCC): ICD-10-CM

## 2019-03-22 DIAGNOSIS — J18.9 PNEUMONIA: ICD-10-CM

## 2019-03-22 LAB
ANION GAP SERPL CALCULATED.3IONS-SCNC: 12.9 MMOL/L
ANISOCYTOSIS BLD QL: ABNORMAL
BACTERIA SPEC AEROBE CULT: NORMAL
BUN BLD-MCNC: 12 MG/DL (ref 6–20)
BUN/CREAT SERPL: 11.8 (ref 7–25)
CALCIUM SPEC-SCNC: 8.8 MG/DL (ref 8.6–10.5)
CHLORIDE SERPL-SCNC: 99 MMOL/L (ref 98–107)
CO2 SERPL-SCNC: 28.1 MMOL/L (ref 22–29)
CREAT BLD-MCNC: 1.02 MG/DL (ref 0.57–1)
DACRYOCYTES BLD QL SMEAR: ABNORMAL
DEPRECATED RDW RBC AUTO: 51.3 FL (ref 37–54)
EOSINOPHIL # BLD MANUAL: 0.56 10*3/MM3 (ref 0–0.4)
EOSINOPHIL NFR BLD MANUAL: 6 % (ref 0.3–6.2)
ERYTHROCYTE [DISTWIDTH] IN BLOOD BY AUTOMATED COUNT: 21.4 % (ref 12.3–15.4)
GFR SERPL CREATININE-BSD FRML MDRD: 58 ML/MIN/1.73
GLUCOSE BLD-MCNC: 282 MG/DL (ref 65–99)
HCT VFR BLD AUTO: 31.7 % (ref 34–46.6)
HGB BLD-MCNC: 8.8 G/DL (ref 12–15.9)
HYPOCHROMIA BLD QL: ABNORMAL
LYMPHOCYTES # BLD MANUAL: 2.71 10*3/MM3 (ref 0.7–3.1)
LYMPHOCYTES NFR BLD MANUAL: 29 % (ref 19.6–45.3)
LYMPHOCYTES NFR BLD MANUAL: 4 % (ref 5–12)
MCH RBC QN AUTO: 18.8 PG (ref 26.6–33)
MCHC RBC AUTO-ENTMCNC: 27.8 G/DL (ref 31.5–35.7)
MCV RBC AUTO: 67.7 FL (ref 79–97)
METAMYELOCYTES NFR BLD MANUAL: 1 % (ref 0–0)
MICROCYTES BLD QL: ABNORMAL
MONOCYTES # BLD AUTO: 0.37 10*3/MM3 (ref 0.1–0.9)
NEUTROPHILS # BLD AUTO: 5.62 10*3/MM3 (ref 1.4–7)
NEUTROPHILS NFR BLD MANUAL: 59 % (ref 42.7–76)
NEUTS BAND NFR BLD MANUAL: 1 % (ref 0–5)
PLATELET # BLD AUTO: 537 10*3/MM3 (ref 140–450)
PMV BLD AUTO: 8.4 FL (ref 6–12)
POLYCHROMASIA BLD QL SMEAR: ABNORMAL
POTASSIUM BLD-SCNC: 4.9 MMOL/L (ref 3.5–5.2)
RBC # BLD AUTO: 4.68 10*6/MM3 (ref 3.77–5.28)
SCAN SLIDE: NORMAL
SMALL PLATELETS BLD QL SMEAR: ABNORMAL
SODIUM BLD-SCNC: 140 MMOL/L (ref 136–145)
WBC NRBC COR # BLD: 9.36 10*3/MM3 (ref 3.4–10.8)

## 2019-03-22 PROCEDURE — 80048 BASIC METABOLIC PNL TOTAL CA: CPT | Performed by: FAMILY MEDICINE

## 2019-03-22 PROCEDURE — 85025 COMPLETE CBC W/AUTO DIFF WBC: CPT | Performed by: FAMILY MEDICINE

## 2019-08-29 NOTE — PROGRESS NOTES
"This is a 47-year-old female who has a several year of progressive neurological deterioration being manifest primarily by unsteadiness, loss of balance, and inability to walk independently.  This is gotten progressively worse to the point that she is now virtually wheelchair bound.  She has seen a neurologist who was of the opinion that she had severe diabetes and secondary neuropathy which explained her symptoms.  There was one question raised to the possibility of multiple sclerosis which has not been completely resolved.  A cervical MRI was performed showing spinal stenosis and she is referred for neurosurgical consultation.     The symptomatology with which she presents and its progression are not that associated with a cervical myelopathy.  Furthermore the cervical MRI does not show abnormal signal within the spinal cord.  Therefore the likelihood of this being related to cervical myelopathy secondary to degenerative osteoarthritic changes in the cervical spine is remote.  She apparently has had lumbar MRI performed in the past showing \"spurs\".  Nevertheless, her symptom complex are not that of neurogenic claudication    Emg/ncv: IMPRESSION:        Sensorimotor neuropathy, axonal, moderate     No evidence for radiculopathy is seen in either leg       Total myelography and postmyelogram CT scans were performed.  He has degenerative disc disease and facet arthrosis however she does not have a high-grade spinal stenosis that would provide anatomical/clinical correlation.  The nerve conduction studies are most consistent with a peripheral neuropathy.  Her glucose is 171 today.    Fluids were sent for MS profile.  They are not back as yet.  I will review this and then send a complete note.    I have recommended that she be evaluated and seen by endocrinology for better a closer management of her diabetes.  She also needs to have intensive physical therapy.  " yes

## 2020-01-01 ENCOUNTER — HOSPITAL ENCOUNTER (EMERGENCY)
Facility: HOSPITAL | Age: 50
End: 2020-04-10
Attending: EMERGENCY MEDICINE | Admitting: EMERGENCY MEDICINE

## 2020-01-01 VITALS — WEIGHT: 280 LBS | HEIGHT: 63 IN | BODY MASS INDEX: 49.61 KG/M2 | TEMPERATURE: 98.5 F

## 2020-01-01 DIAGNOSIS — R41.89 UNRESPONSIVE: ICD-10-CM

## 2020-01-01 DIAGNOSIS — I46.9 CARDIAC ARREST (HCC): Primary | ICD-10-CM

## 2020-01-01 LAB
GLUCOSE BLDC GLUCOMTR-MCNC: 125 MG/DL (ref 70–130)
SARS-COV-2 RNA RESP QL NAA+PROBE: NOT DETECTED

## 2020-01-01 PROCEDURE — 94002 VENT MGMT INPAT INIT DAY: CPT

## 2020-01-01 PROCEDURE — 92950 HEART/LUNG RESUSCITATION CPR: CPT

## 2020-01-01 PROCEDURE — 87635 SARS-COV-2 COVID-19 AMP PRB: CPT | Performed by: EMERGENCY MEDICINE

## 2020-01-01 PROCEDURE — 82962 GLUCOSE BLOOD TEST: CPT

## 2020-01-01 PROCEDURE — 94799 UNLISTED PULMONARY SVC/PX: CPT

## 2020-01-01 PROCEDURE — 25010000002 EPINEPHRINE PF 1 MG/10ML SOLUTION PREFILLED SYRINGE: Performed by: EMERGENCY MEDICINE

## 2020-01-01 PROCEDURE — 99284 EMERGENCY DEPT VISIT MOD MDM: CPT

## 2020-01-01 RX ADMIN — SODIUM BICARBONATE 50 MEQ: 84 INJECTION, SOLUTION INTRAVENOUS at 02:14

## 2020-01-01 RX ADMIN — EPINEPHRINE 1 MG: 0.1 INJECTION, SOLUTION ENDOTRACHEAL; INTRACARDIAC; INTRAVENOUS at 02:15

## 2020-01-01 RX ADMIN — EPINEPHRINE 1 MG: 0.1 INJECTION, SOLUTION ENDOTRACHEAL; INTRACARDIAC; INTRAVENOUS at 02:18

## 2020-01-01 RX ADMIN — EPINEPHRINE 1 MG: 0.1 INJECTION, SOLUTION ENDOTRACHEAL; INTRACARDIAC; INTRAVENOUS at 02:12

## 2020-03-03 NOTE — ED NOTES
Date Of Procedure: \\n2.24.2020 Patient is resting on stretcher with patient's family at bedside. Explained to patient that we were administering her more fluids at this time, patient verbalizes understanding, declines needing anything further. Patient is alert and oriented x4, respirations are regular and unlabored, NADN, will continue to monitor.     Seven Green RN  06/07/17 3194

## 2020-04-10 NOTE — ED NOTES
Pt arrived to the ER at this time. COVID precautions initiated by all staff at this time.      Daisha Ott RN  04/10/20 0347

## 2020-04-10 NOTE — ED NOTES
Pt is leaving this time with [facility]  Home.      Daisha Ott, RN  04/10/20 0418

## 2020-04-10 NOTE — ED NOTES
Pt presents to the ER via [facility] with a 7.5 ETT 20 at the lip. Pt was at home with ,  states she c/o shortness of breath and collapsed at approx 0135 and called 911. EMS arrived at the scene and began CPR. Pt presents with HR of 116bpm, being mechanically ventilated via ETT. Dr. Ames at bedside at this time.      Lydia Rodríguez, RN  04/10/20 0258

## 2020-04-10 NOTE — ED NOTES
Pt  assisted to [facility] at this time. Myself, Lexus Blair, Lead RN, and Dr. Ames to the Chapel. Pt  Hunter Lopez notified of Pt condition and expiration at this time. Pt  states that Pt has been chronically ill for several years, bed bound and only able to transfer self around the house in a wheelchair. Pt  states that for the last several weeks Pt has complained of URI symptoms and had a round of antibiotics a couple of weeks ago and continues to progressively worsen. Pt  states that Pt had a tele-health meeting with her physician today and was having antibiotics called in again. Pt  denies fevers, reports SOA, denies cough, denies being in contact with anyone who has been sick. Pt  states that makenzie Pt was complaining of SOA and asking to go outside so he wheeled her to the porch and as they wheeled outside, Pt began having a panic attack, became unresponsive and began foaming at the mouth. Pt  states that he called EMS and it took around 20-30 minutes before they arrived and CPR was never started. Pt  states EMS worked the Pt in their driveway for around 20 minutes before leaving. Pt  states that he lives with his wife, daughter and grandson. Pt  denies that anyone in the home has been sick.     Daisha Ott RN  04/10/20 8720

## 2020-04-10 NOTE — ED PROVIDER NOTES
Subjective   50-year-old female brought to the emergency department after collapsing at home being unresponsive.   reports that she had complained of being short of breath, collapsed and was foaming at the mouth.  Reports she was not breathing and had no pulse.  He did not perform CPR at that time.  He called EMS, which took approximately 20 minutes to arrive to the house to find her unresponsive.  ACLS protocol was initiated at that time, and patient brought to the emergency department for further evaluation and treatment.  Initially the patient was unresponsive, not breathing on her own, without a pulse.  In route to the emergency department she was  1 dose of bicarbonate, and 5 doses of epinephrine.  When EMS arrived to the emergency department they regained a pulse, she was immediately brought into the emergency department were ACLS protocol was continued.  Patient has significant past medical history of diabetes mellitus, obesity, chronic pain, arthritis, depression, hypertension, multiple sclerosis, and a stroke in the past.   reports that she has been ill for the last few days with increasing shortness of breath.  Otherwise he denies nausea, vomiting, diarrhea, fever, or chills.  Denies cough.  Denies any recent travel or exposure to Covid-19      History provided by:  EMS personnel   used: No    Cardiac Arrest   Witnessed by:  Family member  Incident location:  Home  Time since incident:  40 minutes  Time before BLS initiated:  > 5 minutes  Time before ALS initiated:  > 10 minutes  Condition upon EMS arrival:  Unresponsive  Pulse:  Absent  Initial cardiac rhythm per EMS:  Asystole  Treatments prior to arrival:  ACLS protocol, intubation and vascular access  Medications given prior to ED:  Epinephrine  IV access type:  Intraosseous  Airway:  Intubation prior to arrival  Rhythm on admission to ED:  Sinus tachycardia  Associated symptoms: no chest pain, no difficulty breathing,  no dizziness, no fatigue, no heartburn, no loss of consciousness, no palpitations, no shortness of breath, no syncope, no vomiting and no weakness    Risk factors: diabetes mellitus and hyperlipidemia    Risk factors: no COPD, no drug overdose, no head injury, no heart problem and no trauma        Review of Systems   Unable to perform ROS: Acuity of condition   Constitutional: Negative for fatigue.   Respiratory: Negative for shortness of breath.    Cardiovascular: Negative for chest pain, palpitations and syncope.   Gastrointestinal: Negative for heartburn and vomiting.   Neurological: Negative for dizziness, loss of consciousness and weakness.   All other systems reviewed and are negative.      Past Medical History:   Diagnosis Date   • Arthritis    • Chronic headaches    • Depression    • Diabetes mellitus (CMS/HCC)    • Hypertension    • Menopause    • Multiple sclerosis (CMS/HCC)    • Stroke (CMS/HCC)     august this year-left side affected,speech slurrs when tired.       Allergies   Allergen Reactions   • Erythromycin    • Tramadol        Past Surgical History:   Procedure Laterality Date   • CHOLECYSTECTOMY     • TONSILLECTOMY         Family History   Problem Relation Age of Onset   • Stroke Mother    • Diabetes Mother    • Cancer Father         Pancreatic CA       Social History     Socioeconomic History   • Marital status:      Spouse name: Not on file   • Number of children: Not on file   • Years of education: Not on file   • Highest education level: Not on file   Tobacco Use   • Smoking status: Never Smoker   Substance and Sexual Activity   • Alcohol use: No   Social History Narrative    Patient is .            Objective   Physical Exam   Constitutional: She appears well-developed and well-nourished. No distress. She is intubated.   HENT:   Head: Normocephalic and atraumatic.   Right Ear: External ear normal.   Left Ear: External ear normal.   Nose: Nose normal.   Eyes: Right conjunctiva is  injected. Left conjunctiva is injected. Right pupil is not reactive. Left pupil is not reactive.   No corneal reflex noted.  Pupils fixed and dilated.  Dry mucous membranes.   Neck: Neck supple.   Cardiovascular: Intact distal pulses. Tachycardia present.   Pulses:       Carotid pulses are 1+ on the right side, and 1+ on the left side.       Radial pulses are 1+ on the right side, and 1+ on the left side.        Femoral pulses are 1+ on the right side, and 1+ on the left side.  Pulmonary/Chest: No stridor. She is intubated. She has no wheezes. She has no rales.   Abdominal: She exhibits distension.   Genitourinary: Vagina normal.   Neurological: She is unresponsive. GCS eye subscore is 1. GCS verbal subscore is 1. GCS motor subscore is 1.   Skin: Capillary refill takes more than 3 seconds. She is not diaphoretic. There is pallor.   Cyanosis noted to the neck and face.   Nursing note and vitals reviewed.      Procedures           ED Course  ED Course as of Apr 10 0441   Fri Apr 10, 2020   0421 Patient arrived to the emergency department intubated and with pulse regained in route from scene.  Patient had collapsed, and was unresponsive 20 minutes prior to arrival of EMS.  Once EMS arrived he started ACLS protocol.  In route to the emergency department patient was given 1 dose of bicarbonate and 5 doses of epinephrine.  Patient was asystole for greater than 30 minutes.  Just before arriving to the emergency department pulse regained, patient was brought into the emergency department for immediate continuation of ACLS protocol.  Once in the emergency department evaluated at bedside, pulse was lost, patient was asystolic on the monitor within 5 minutes of arrival.  Moreover, pupils are fixed and dilated, absent corneal reflex, and cyanosis noted.  Immediately continued ACLS protocol with CPR initiation, and aggressive medical management.  A pulse was never regained, patient remained asystolic during aggressive medical  management and ACLS protocol.  Time of death was called at 2:22 AM.    [ES]      ED Course User Index  [ES] Chandana Ames MD                                           MDM  Number of Diagnoses or Management Options     Amount and/or Complexity of Data Reviewed  Clinical lab tests: ordered and reviewed  Tests in the radiology section of CPT®: ordered and reviewed  Tests in the medicine section of CPT®: ordered and reviewed  Review and summarize past medical records: yes  Independent visualization of images, tracings, or specimens: yes    Risk of Complications, Morbidity, and/or Mortality  Presenting problems: high  Diagnostic procedures: high  Management options: high    Critical Care  Total time providing critical care: 30-74 minutes    Patient Progress  Patient progress: (Critical)      Final diagnoses:   Cardiac arrest (CMS/HCC)   Unresponsive            Chandana Ames MD  04/10/20 0444

## 2020-04-10 NOTE — ED NOTES
Spoke with Aquiles Hall with [facility] at this time. States she will call back in 20-30 minutes.     Tommy Pretty RN  04/10/20 4243

## 2020-04-10 NOTE — ED NOTES
Called Jarod at this time, states she is released to  home.      Lydia Rodríguez RN  04/10/20 0236

## 2020-04-10 NOTE — ED NOTES
TOD called at this time by Dr. Ames. Pt GCS 3, pupils fixed and dilated, no spontaneous rise and fall of chest wall, absent breath sounds, no heart tones auscultated. No palpable pulses or detected by doppler. Pt is gray in color, cyanotic at lips, nail beds, Pt is cool to touch. Pt cap refill is greater than 3 seconds.      Daisha Ott, RN  04/10/20 0359

## 2020-04-10 NOTE — ED NOTES
Pt  leaving the ER at this time. Pt  requests for Pt to be taken to [facility]. Contacted [facility] at this time.      Daisha Ott RN  04/10/20 1277

## 2020-04-10 NOTE — ED NOTES
Post mortem care performed at this time. COVID precautions maintained.      Daisha Ott RN  04/10/20 0334

## 2020-04-10 NOTE — ED NOTES
Returned phone call at this time by Jarod Montano, states that he is on his way to  the Pt to be transported to the  home.      Daisha Ott RN  04/10/20 6394